# Patient Record
Sex: FEMALE | Race: WHITE | Employment: PART TIME | URBAN - METROPOLITAN AREA
[De-identification: names, ages, dates, MRNs, and addresses within clinical notes are randomized per-mention and may not be internally consistent; named-entity substitution may affect disease eponyms.]

---

## 2019-03-23 ENCOUNTER — OFFICE VISIT (OUTPATIENT)
Dept: URGENT CARE | Facility: URGENT CARE | Age: 39
End: 2019-03-23
Payer: OTHER MISCELLANEOUS

## 2019-03-23 VITALS
OXYGEN SATURATION: 100 % | TEMPERATURE: 98.2 F | HEART RATE: 67 BPM | DIASTOLIC BLOOD PRESSURE: 67 MMHG | SYSTOLIC BLOOD PRESSURE: 103 MMHG

## 2019-03-23 DIAGNOSIS — M25.531 RIGHT WRIST PAIN: Primary | ICD-10-CM

## 2019-03-23 NOTE — NURSING NOTE
"Chief Complaint   Patient presents with     Forms     needs forms filled out to return to work missed from 3/15-3/22 due to wrist pain       Initial /67   Pulse 67   Temp 98.2  F (36.8  C) (Oral)   SpO2 100%   Breastfeeding? No  Estimated body mass index is 29.33 kg/m  as calculated from the following:    Height as of 6/25/14: 1.664 m (5' 5.5\").    Weight as of 6/25/14: 81.2 kg (179 lb).  Medication Reconciliation: complete    Anastasiya Hadley CMA      "

## 2019-03-25 ENCOUNTER — OFFICE VISIT (OUTPATIENT)
Dept: FAMILY MEDICINE | Facility: CLINIC | Age: 39
End: 2019-03-25

## 2019-03-25 VITALS
SYSTOLIC BLOOD PRESSURE: 128 MMHG | TEMPERATURE: 98.2 F | DIASTOLIC BLOOD PRESSURE: 79 MMHG | HEIGHT: 67 IN | HEART RATE: 68 BPM | BODY MASS INDEX: 28.46 KG/M2 | OXYGEN SATURATION: 100 %

## 2019-03-25 DIAGNOSIS — M77.8 WRIST TENDONITIS: Primary | ICD-10-CM

## 2019-03-25 PROCEDURE — 99203 OFFICE O/P NEW LOW 30 MIN: CPT | Performed by: FAMILY MEDICINE

## 2019-03-25 NOTE — PROGRESS NOTES
"  SUBJECTIVE:   Leesa Shah is a 38 year old female who presents to clinic today for the following health issues:        Needs letter to return to work, was out due to wrist pain      Pt seen outside our system for wrist pain  She was told to wear wrist braces  She had tendonitis due to overuse.  Her wrist pain in now nearly gone  She would like to return to work without restrictions    Problem list and histories reviewed & adjusted, as indicated.  Additional history: as documented    Labs reviewed in EPIC    Reviewed and updated as needed this visit by clinical staff  Tobacco  Allergies  Meds  Med Hx  Surg Hx  Fam Hx  Soc Hx      Reviewed and updated as needed this visit by Provider         ROS:  Constitutional, HEENT, cardiovascular, pulmonary, gi and gu systems are negative, except as otherwise noted.    OBJECTIVE:     /79   Pulse 68   Temp 98.2  F (36.8  C) (Oral)   Ht 1.689 m (5' 6.5\")   SpO2 100%   BMI 28.46 kg/m    Body mass index is 28.46 kg/m .  GENERAL: healthy, alert and no distress  MS: no gross musculoskeletal defects noted, no edema    Diagnostic Test Results:  none     ASSESSMENT/PLAN:     (M77.8) Wrist tendonitis  (primary encounter diagnosis)  Comment: improved with rest,   Plan: okay to RTW          Trini Purcell MD  [facility]    "

## 2022-11-30 ENCOUNTER — OFFICE VISIT (OUTPATIENT)
Dept: ORTHOPEDICS | Facility: CLINIC | Age: 42
End: 2022-11-30
Payer: COMMERCIAL

## 2022-11-30 ENCOUNTER — ANCILLARY PROCEDURE (OUTPATIENT)
Dept: GENERAL RADIOLOGY | Facility: CLINIC | Age: 42
End: 2022-11-30
Attending: PEDIATRICS
Payer: COMMERCIAL

## 2022-11-30 DIAGNOSIS — M25.531 RIGHT WRIST PAIN: Primary | ICD-10-CM

## 2022-11-30 DIAGNOSIS — S69.91XA WRIST INJURY, RIGHT, INITIAL ENCOUNTER: ICD-10-CM

## 2022-11-30 DIAGNOSIS — M25.531 RIGHT WRIST PAIN: ICD-10-CM

## 2022-11-30 PROCEDURE — 99203 OFFICE O/P NEW LOW 30 MIN: CPT | Performed by: PEDIATRICS

## 2022-11-30 PROCEDURE — 73110 X-RAY EXAM OF WRIST: CPT | Mod: TC | Performed by: RADIOLOGY

## 2022-11-30 NOTE — PROGRESS NOTES
ASSESSMENT & PLAN    Leesa was seen today for pain.    Diagnoses and all orders for this visit:    Right wrist pain  -     XR Wrist Right G/E 3 Views; Future  -     Occupational Therapy Referral; Future    Wrist injury, right, initial encounter  -     Wrist/Arm/Hand Supplies Order for DME - ONLY FOR DME      Ddx: dorsal impingement, some component deQuervain, occult ganglion, ligamentous injury.  Hand therapy next.  Potential future MRI with contrast for wrist if not improving.  Questions answered. Discussed signs and symptoms that may indicate more serious issues; the patient was instructed to seek appropriate care if noted. eLesa indicates understanding of these issues and agrees with the plan.        See Patient Instructions  Patient Instructions   Right wrist findings most consistent with some dorsal impingement.  I think there is also some component de Quervain tendinitis, given exam findings and some locations of pain.  X-rays are without significant bony abnormality today.  We discussed symptom management, activity modification, potential for imaging, rehab, I also use of support for the hand and wrist.  Following discussion, plan hand therapy next, referral placed.  Brace/support options reviewed, including with wrist braces (with or without thumb spica).  Plan to monitor course with hand therapy approximately 4 weeks to start.  If improving well during that time, follow-up is open-ended.  If not improving well during that time, or if any other questions or concerns in the meantime, contact clinic.  We can certainly reconsider obtaining additional imaging if needed.      If you have any further questions for your physician or physician s care team you can contact them thru MyChart or by calling  [phone] and use option 3 to leave a voice message.   Messages received during business hours will be returned same day.          Vasile Watson DO  [facility]  [place]    -----  Chief Complaint   Patient presents with     Right Wrist - Pain       SUBJECTIVE  Leesa Shah is a/an 42 year old female who is seen as a self referral for evaluation of right wrist pain.  Pain has been present for about 3 weeks.  No specific injury, but she does have a 5 yo autistic child and she does need to lift them at times.       The patient is seen by themselves.  The patient is Right handed    Onset: 3 week(s) ago. Reports insidious onset without acute precipitating event.  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  Also lifts weights.  + pain at rest, dorsal wrist, notes more as a dullness at rest, but pain more with activities.  Has noted some tenderness.  No noted swelling or bruising. No noted joint noise.        REVIEW OF SYSTEMS:  Review of Systems      OBJECTIVE:  There were no vitals taken for this visit.       Hand/wrist (right):    Inspection:  No deformity noted.  No swelling. No ecchymosis.    Motion:  Forearm pronation , forearm supination full.  Wrist flexion grossly intact, some dorsal and radial pain  Wrist extension lacking 5-10 deg actively, with pain present; passive motion grossly intact, but also with pain  Wrist radial deviation intact, mild radial pain  Wrist ulnar deviation grossly intact, mild radial pain  Digit motion full    Strength:  Some discomfort with resisted thumb extension and abduction, but no pain with resisted wrist motion    Sensation:  Grossly intact light touch.    Radial pulses normal, +2/4, capillary refill brisk.    Palpation:  Tender radial styloid, first dorsal compartment, radiocarpal joint, mild intersection first-second compartments  Nontender remainder    Finkelstein positive.    Scaphoid shift  neg.      RADIOLOGY:  I independently ordered, visualized and reviewed these images with the patient  No acute bony abnormality noted.      Recent Results (from the past 24 hour(s))   XR Wrist Right G/E 3 Views    Narrative    XR WRIST RIGHT G/E 3 VIEWS 11/30/2022 9:48 AM     HISTORY: Right wrist pain    COMPARISON: None.       Impression    IMPRESSION: Normal joint spaces and alignment. No acute fracture.    CRISTY ZHAO MD         SYSTEM ID:  [id number]

## 2022-11-30 NOTE — LETTER
11/30/2022         RE: Leesa Shah  [address] [address]  [place] [address]        Dear Colleague,    Thank you for referring your patient, Leesa Shah, to the [facility] [place]. Please see a copy of my visit note below.    ASSESSMENT & PLAN    Leesa was seen today for pain.    Diagnoses and all orders for this visit:    Right wrist pain  -     XR Wrist Right G/E 3 Views; Future  -     Occupational Therapy Referral; Future    Wrist injury, right, initial encounter  -     Wrist/Arm/Hand Supplies Order for DME - ONLY FOR DME      Ddx: dorsal impingement, some component deQuervain, occult ganglion, ligamentous injury.  Hand therapy next.  Potential future MRI with contrast for wrist if not improving.  Questions answered. Discussed signs and symptoms that may indicate more serious issues; the patient was instructed to seek appropriate care if noted. Leesa indicates understanding of these issues and agrees with the plan.        See Patient Instructions  Patient Instructions   Right wrist findings most consistent with some dorsal impingement.  I think there is also some component de Quervain tendinitis, given exam findings and some locations of pain.  X-rays are without significant bony abnormality today.  We discussed symptom management, activity modification, potential for imaging, rehab, I also use of support for the hand and wrist.  Following discussion, plan hand therapy next, referral placed.  Brace/support options reviewed, including with wrist braces (with or without thumb spica).  Plan to monitor course with hand therapy approximately 4 weeks to start.  If improving well during that time, follow-up is open-ended.  If not improving well during that time, or if any other questions or concerns in the meantime, contact clinic.  We can certainly reconsider obtaining additional imaging if needed.      If you have any further questions for your physician or  physician s care team you can contact them thru MyChart or by calling  [phone] and use option 3 to leave a voice message.   Messages received during business hours will be returned same day.          DO [facility] [place]    -----  Chief Complaint   Patient presents with     Right Wrist - Pain       SUBJECTIVE  Leesa Shah is a/an 42 year old female who is seen as a self referral for evaluation of right wrist pain.  Pain has been present for about 3 weeks.  No specific injury, but she does have a 5 yo autistic child and she does need to lift them at times.       The patient is seen by themselves.  The patient is Right handed    Onset: 3 week(s) ago. Reports insidious onset without acute precipitating event.  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  Also lifts weights.  + pain at rest, dorsal wrist, notes more as a dullness at rest, but pain more with activities.  Has noted some tenderness.  No noted swelling or bruising. No noted joint noise.        REVIEW OF SYSTEMS:  Review of Systems      OBJECTIVE:  There were no vitals taken for this visit.       Hand/wrist (right):    Inspection:  No deformity noted.  No swelling. No ecchymosis.    Motion:  Forearm pronation , forearm supination full.  Wrist flexion grossly intact, some dorsal and radial pain  Wrist extension lacking 5-10 deg actively, with pain present; passive motion grossly intact, but also with pain  Wrist radial deviation intact, mild radial pain  Wrist ulnar deviation grossly intact, mild radial pain  Digit motion full    Strength:  Some discomfort with resisted thumb extension and abduction, but no pain with resisted wrist  motion    Sensation:  Grossly intact light touch.    Radial pulses normal, +2/4, capillary refill brisk.    Palpation:  Tender radial styloid, first dorsal compartment, radiocarpal joint, mild intersection first-second compartments  Nontender remainder    Finkelstein positive.    Scaphoid shift neg.      RADIOLOGY:  I independently ordered, visualized and reviewed these images with the patient  No acute bony abnormality noted.      Recent Results (from the past 24 hour(s))   XR Wrist Right G/E 3 Views    Narrative    XR WRIST RIGHT G/E 3 VIEWS 11/30/2022 9:48 AM     HISTORY: Right wrist pain    COMPARISON: None.       Impression    IMPRESSION: Normal joint spaces and alignment. No acute fracture.    CRISTY ZHAO MD         SYSTEM ID:  [id number]               Again, thank you for allowing me to participate in the care of your patient.        Sincerely,        Vasile Watson DO

## 2022-11-30 NOTE — PATIENT INSTRUCTIONS
Right wrist findings most consistent with some dorsal impingement.  I think there is also some component de Quervain tendinitis, given exam findings and some locations of pain.  X-rays are without significant bony abnormality today.  We discussed symptom management, activity modification, potential for imaging, rehab, also use of support for the hand and wrist.  Following discussion, plan hand therapy next, referral placed.  Brace/support options reviewed, including with wrist braces (with or without thumb spica).  Plan to monitor course with hand therapy approximately 4 weeks to start.  If improving well during that time, follow-up is open-ended.  If not improving well during that time, or if any other questions or concerns in the meantime, contact clinic.  We can certainly reconsider obtaining additional imaging if needed.      If you have any further questions for your physician or physician s care team you can contact them thru [employer] or by calling  [phone] and use option 3 to leave a voice message.   Messages received during business hours will be returned same day.

## 2022-12-29 ENCOUNTER — THERAPY VISIT (OUTPATIENT)
Dept: OCCUPATIONAL THERAPY | Facility: CLINIC | Age: 42
End: 2022-12-29
Payer: COMMERCIAL

## 2022-12-29 DIAGNOSIS — M25.531 RIGHT WRIST PAIN: ICD-10-CM

## 2022-12-29 PROCEDURE — 97165 OT EVAL LOW COMPLEX 30 MIN: CPT | Mod: GO | Performed by: OCCUPATIONAL THERAPIST

## 2022-12-29 PROCEDURE — 97110 THERAPEUTIC EXERCISES: CPT | Mod: GO | Performed by: OCCUPATIONAL THERAPIST

## 2022-12-29 PROCEDURE — 97760 ORTHOTIC MGMT&TRAING 1ST ENC: CPT | Mod: GO | Performed by: OCCUPATIONAL THERAPIST

## 2022-12-29 NOTE — PROGRESS NOTES
[facility]    OUTPATIENT Occupational Therapy ORTHOPEDIC EVALUATION  PLAN OF TREATMENT FOR OUTPATIENT REHABILITATION  (COMPLETE FOR INITIAL CLAIMS ONLY)  Patient's Last Name, First Name, M.I.  YOB: 1980  Leesa Shah    Provider s Name:  [facility]   Medical Record No.  [id number]   Start of Care Date:  12/29/22   Onset Date:   11/30/22 (therapy referral)   Treatment Diagnosis:    Medical Diagnosis:  Right wrist pain       Goals:     12/29/22 0500   Goal #1   Goal #1 household chores   Previous Performance Level Independent   Current Functional Task Reach   Current Performance Level pain 8/10   STG Target Perfomance Shelves   STG Target Perform Level pain 5/10 or less   Due Date 01/27/23   LTG Target Task/Performance Other - on additional line   Other Household Chores pain 3/10 or less reaching shelves   Due Date 02/26/23         Therapy Frequency:  1 x per week  Predicted Duration of Therapy Intervention:  2 months    Michelle Zayas OT                 I CERTIFY THE NEED FOR THESE SERVICES FURNISHED UNDER        THIS PLAN OF TREATMENT AND WHILE UNDER MY CARE     (Physician attestation of this document indicates review and certification of the therapy plan).                     Certification Date From:  12/29/22   Certification Date To:  02/26/23    Referring Provider:  Vasile Watson    Initial Assessment        See Epic Evaluation SOC Date: 12/29/22

## 2022-12-29 NOTE — PROGRESS NOTES
Hand Therapy Initial Evaluation    Current Date:  12/29/2022    Subjective:  Leesa Shah is a 42 year old right hand dominant female.    Diagnosis:   Right wrist pain  DOI:  11/30/2022 (theray referral)    Patient reports symptoms of pain, stiffness/loss of motion and weakness/loss of strength of the right wrist which occurred over the past 1-2 months possibly due to lifting autistic son. Since onset symptoms are unchanged  Special tests:  x-ray.  Previous treatment: Rest, NSAID's, OTC MEGHA thumb spica splint wear sometimes.  General health as reported by patient is excellent.  Pertinent medical history includes:None  Medical allergies:none.  Surgical history: none.  Medication history: None.    Occupational Profile Information:  Current occupation is [employer]   Currently working in normal job without restrictions  Job Tasks: Driving  Prior functional level:  no limitations  Barriers include:none  Mobility: No difficulty  Transportation: drives  Other: has 6 year old autistic son    Functional Outcome Measure:  Upper Extremity Functional Index  SCORE:   Column Totals: 73/80  (A lower score indicates greater disability.)    Objective:  Pain Level (Scale 0-10):   12/29/2022   At Rest 0   With Use 3   Worst 8     Pain Description:  Date 12/29/2022   Location Dorsal wrist and thumb   Pain Quality Aching   Frequency intermittent     Pain is worst  daytime   Exacerbated by  forced hyperextension, lifting, carrying, using thumb on phone   Relieved by NSAIDs and rest   Progression Unchanged     Sensation   WNL throughout all nerve distributions; per patient report    Edema   - none  + mild    ++ moderate    +++ severe    12/29/2022   1st dc -   Radial Styloid +      ROM  AROM thumb near WNL's, pain with thumb extension and flexion    Wrist 12/29/2022 12/29/2022   AROM (PROM) Left Right   Extension 80 65-   Flexion 55 60-   RD 20 10+   UD 45 40-   UD with Th Flex 30 15++     Resisted Testing  Pain Report: -  none  + mild    ++ moderate    +++ severe    12/29/2022   APL -   EPB +   EPL +   FCR + slight     Strength   (Measured in pounds)  Pain Report: - none  + mild    ++ moderate    +++ severe    12/29/2022 12/29/2022   Trials Left Right   1  2  3 48  44  48 32-  37-  26-   Average 47 32     Lat Pinch 12/29/2022 12/29/2022   Trials Left Right   1  2  3 15  13  12 12-  14-  11+   Average 13 12     3 Pt Pinch 12/29/2022 12/29/2022   Trials Left Right   1  2  3 13  13  12 13-  11+  11+   Average 13 12     Special Tests   Pain Report:  - none    + mild    ++ moderate    +++ severe    12/14/2022   WHAT Test +++   Finkelsteins ++   Thumb CMC Adduction Stress Test -   Thumb CMC Extension Stress Test +     Palpation  Pain Report:  - none    + mild    ++ moderate    +++ severe    12/29/2022   Radial Styloid ++   1st DC +   FCR -   Thumb CMC +   PIN Site -   Extensor Wad -   DRUJ -     Assessment:  Patient presents with symptoms consistent with diagnosis of right radial wrist pain/1st dc, with conservative intervention.     Patient's limitations or Problem List includes:  Pain, Decreased ROM/motion, Increased edema, Weakness, Decreased , Decreased pinch and Tightness in musculature of the right wrist and thumb which interferes with the patient's ability to perform Recreational Activities and Household Chores as compared to previous level of function.    Rehab Potential:  Excellent - Return to full activity, no limitations    Patient will benefit from skilled Occupational Therapy to increase ROM, flexibility, overall strength,  strength and pinch strength and decrease pain and edema to return to previous activity level and resume normal daily tasks and to reach their rehab potential.    Barriers to Learning:  No barrier    Communication Issues:  Patient appears to be able to clearly communicate and understand verbal and written communication and follow directions correctly.    Chart Review: Chart Review and Simple  "history review with patient    Identified Performance Deficits: child rearing, home establishment and management and leisure activities    Assessment of Occupational Performance:  3-5 Performance Deficits    Clinical Decision Making (Complexity): Low complexity    Treatment Explanation:  The following has been discussed with the patient:  RX ordered/plan of care  Anticipated outcomes  Possible risks and side effects    Plan:  Frequency:  1 X week, once daily  Duration:  for 2 months    Treatment Plan:    Modalities:    US   Therapeutic Exercise:    AROM, PROM, Tendon Gliding and Isotonics  Neuromuscular re-ed:   Kinesiotaping  Manual Techniques:   Friction massage and Myofascial release  Orthotic Fabrication:    Static Forearm based  Self Care:    Self Care Tasks and Ergonomic Considerations    Discharge Plan:  Achieve all LTG.  Independent in home treatment program.  Reach maximal therapeutic benefit.    Discharge Plan:    Achieve all LTG.  Independent in home treatment program.  Reach maximal therapeutic benefit.    Home Program:   Warmth for stiffness  Ice after activity for pain  Self TFM to 1st dorsal compartment  Self MFR to EPB/ABL  AROM of wrist and thumb  Ot[place]ock thumb spica orthosis sleeping and per symptoms day  Avoid activities that exacerbate pain in the wrist or thumb, \"park\" thumb and keep wrist nuetral    Next Visit:  See in 1 week  Assess response to HEP and otobock orthosis  Progress to PROM with stretch into simultaneous thumb flexion and ulnar deviation  Consider US to radial wrist if continued tenderness  "

## 2023-01-11 ENCOUNTER — THERAPY VISIT (OUTPATIENT)
Dept: OCCUPATIONAL THERAPY | Facility: CLINIC | Age: 43
End: 2023-01-11
Payer: COMMERCIAL

## 2023-01-11 DIAGNOSIS — M25.531 RIGHT WRIST PAIN: Primary | ICD-10-CM

## 2023-01-11 PROCEDURE — 97140 MANUAL THERAPY 1/> REGIONS: CPT | Mod: GO | Performed by: OCCUPATIONAL THERAPIST

## 2023-01-11 PROCEDURE — 97110 THERAPEUTIC EXERCISES: CPT | Mod: GO | Performed by: OCCUPATIONAL THERAPIST

## 2023-01-11 NOTE — PROGRESS NOTES
"SOAP note objective information for 1/11/2023:    Diagnosis:   Right wrist pain  DOI:  11/30/2022 (theray referral)    Pain Level (Scale 0-10):   12/29/2022 1/11/2023   At Rest 0    With Use 3    Worst 8 5     ROM  Wrist 12/29/2022 12/29/2022 1/11/2023   AROM (PROM) Left Right Right   Extension 80 65- 70   Flexion 55 60- 60   RD 20 10+ 15   UD 45 40- 40   UD with Th Flex 30 15++ 15++     Palpation  Pain Report:  - none    + mild    ++ moderate    +++ severe    12/29/2022 1/11/2023   Radial Styloid ++ ++   1st DC + -   FCR - -   Thumb CMC + -   PIN Site - -   Extensor Wad - -   DRUJ - -     Home Program:   Warmth for stiffness  Ice after activity for pain  Self TFM to 1st dorsal compartment  Self MFR to EPB/ABL  AROM of wrist and thumb  Gentle deQ stretch  Ot[place]ock thumb spica orthosis sleeping and per symptoms day  Avoid activities that exacerbate pain in the wrist or thumb, \"park\" thumb and keep wrist nuetral    Next Visit:  See in 1 week  Assess response to gentle deQ stretch  Continue US to radial wrist   Consider k-tape    Please refer to the daily flowsheet for treatment today, total treatment time and time spent performing 1:1 timed codes.   "

## 2023-03-07 ENCOUNTER — OFFICE VISIT (OUTPATIENT)
Dept: FAMILY MEDICINE | Facility: CLINIC | Age: 43
End: 2023-03-07
Payer: COMMERCIAL

## 2023-03-07 VITALS
HEART RATE: 71 BPM | TEMPERATURE: 98.3 F | DIASTOLIC BLOOD PRESSURE: 73 MMHG | SYSTOLIC BLOOD PRESSURE: 114 MMHG | OXYGEN SATURATION: 100 % | RESPIRATION RATE: 16 BRPM

## 2023-03-07 DIAGNOSIS — F41.9 ANXIETY: Primary | ICD-10-CM

## 2023-03-07 PROCEDURE — 99203 OFFICE O/P NEW LOW 30 MIN: CPT | Performed by: FAMILY MEDICINE

## 2023-03-07 RX ORDER — HYDROXYZINE HYDROCHLORIDE 25 MG/1
25 TABLET, FILM COATED ORAL EVERY 8 HOURS PRN
Qty: 90 TABLET | Refills: 1 | Status: SHIPPED | OUTPATIENT
Start: 2023-03-07

## 2023-03-07 RX ORDER — SERTRALINE HYDROCHLORIDE 25 MG/1
25 TABLET, FILM COATED ORAL DAILY
Qty: 90 TABLET | Refills: 1 | Status: SHIPPED | OUTPATIENT
Start: 2023-03-07 | End: 2023-06-27

## 2023-03-07 ASSESSMENT — ANXIETY QUESTIONNAIRES
2. NOT BEING ABLE TO STOP OR CONTROL WORRYING: NOT AT ALL
3. WORRYING TOO MUCH ABOUT DIFFERENT THINGS: NOT AT ALL
IF YOU CHECKED OFF ANY PROBLEMS ON THIS QUESTIONNAIRE, HOW DIFFICULT HAVE THESE PROBLEMS MADE IT FOR YOU TO DO YOUR WORK, TAKE CARE OF THINGS AT HOME, OR GET ALONG WITH OTHER PEOPLE: NOT DIFFICULT AT ALL
1. FEELING NERVOUS, ANXIOUS, OR ON EDGE: SEVERAL DAYS
7. FEELING AFRAID AS IF SOMETHING AWFUL MIGHT HAPPEN: NOT AT ALL
6. BECOMING EASILY ANNOYED OR IRRITABLE: MORE THAN HALF THE DAYS

## 2023-03-07 ASSESSMENT — PATIENT HEALTH QUESTIONNAIRE - PHQ9
5. POOR APPETITE OR OVEREATING: MORE THAN HALF THE DAYS
SUM OF ALL RESPONSES TO PHQ QUESTIONS 1-9: 9

## 2023-03-07 ASSESSMENT — PAIN SCALES - GENERAL: PAINLEVEL: NO PAIN (0)

## 2023-03-07 NOTE — PROGRESS NOTES
Assessment & Plan     Anxiety  Leesa Shah is a 42 year old female who presents today for concern of anxiety   She requested medication to help her stay calm while taking care of her autistic son    I had a discussion with the patient about her condition , diagnosis  And treatment options.   I recommend the following :    - hydrOXYzine (ATARAX) 25 MG tablet; Take 1 tablet (25 mg) by mouth every 8 hours as needed for itching  - sertraline (ZOLOFT) 25 MG tablet; Take 1 tablet (25 mg) by mouth daily    Follow up in 3 months   Side effects reviewed                Return in about 3 months (around 6/7/2023) for in person.    Homar Hubbard MD  [facility]   Leesa is a 42 year old, presenting for the following health issues:  Anxiety      History of Present Illness       Reason for visit:  Anxiety    She eats 2-3 servings of fruits and vegetables daily.She consumes 1 sweetened beverage(s) daily.She exercises with enough effort to increase her heart rate 30 to 60 minutes per day.  She exercises with enough effort to increase her heart rate 5 days per week.   She is taking medications regularly.    She is concerned about anxiety   Past Medical History:   Diagnosis Date     Bulimia nervosa 15 y.o.    Bulimia & anorexia in the past     Infectious mononucleosis 5/99     Myopia      Obesity, unspecified      Shoulder (girdle) dystocia during labor and deliver, delivered 10/22/05    Hospitalized     Sprain of lumbar region 4/17/00    MId back strain - [facility]     Past Surgical History:   Procedure Laterality Date     HC EPISIOTOMY/VAGINAL REPR BY DANG SORIA  12/18/01    DR HUFFMAN      REPAIR SUPERFICIAL, WOUND FACE/EAR <2.5 CM  10/29/98    Laceration (L) eyebrow     NO HISTORY OF SURGERY  11-26-07    EYES     [facility] FULL ROUT OBSTE CARE,VAGINAL DELIV  12/18/01    BABY GIRL     [facility] FULL ROUT OBSTE CARE,VAGINAL DELIV  10/20/05    baby girl         Family History    Problem Relation Age of Onset     Depression Mother         on prozac     Alcohol/Drug Father         etoh and drug abuse     Allergies Maternal Grandmother         seasonal     Hypertension Maternal Grandmother         on meds     Lipids Maternal Grandmother      Respiratory Maternal Grandmother         asthma     Genetic Disorder Maternal Grandmother         history of meningocele; spina bifida     Asthma Maternal Grandmother      C.A.D. Paternal Grandmother      Alcohol/Drug Paternal Grandfather         etoh     Alzheimer Disease Other         paternal great grandmother     Diabetes Other         Great aunts & great uncles     Asthma Daughter      Cerebrovascular Disease No family hx of      Breast Cancer No family hx of      Cancer - colorectal No family hx of      Prostate Cancer No family hx of      Cancer No family hx of      Eye Disorder No family hx of      Heart Disease No family hx of      Thyroid Disease No family hx of      SH:    Marital status: single -    Kids: 4  Employment: on call   Exercise: 5 days per week   Tobacco: no   Etoh: no   Recreational drugs: no   Caffeine: coffee     Review of Systems   Constitutional, HEENT, cardiovascular, pulmonary, gi and gu systems are negative, except as otherwise noted.      Objective    /73   Pulse 71   Temp 98.3  F (36.8  C) (Tympanic)   Resp 16   LMP  (LMP Unknown)   SpO2 100%   There is no height or weight on file to calculate BMI.  Physical Exam  Vitals and nursing note reviewed.   Constitutional:       General: She is not in acute distress.     Appearance: Normal appearance. She is not ill-appearing, toxic-appearing or diaphoretic.   Neurological:      Mental Status: She is alert.

## 2023-03-27 PROBLEM — M25.531 RIGHT WRIST PAIN: Status: RESOLVED | Noted: 2022-12-29 | Resolved: 2023-03-27

## 2023-03-27 NOTE — PROGRESS NOTES
Discharge Summary - Hand Therapy    Patient no showed for 1/25/2023 appointment and did not return to therapy.  Assume all goals were met to patient's satisfaction. D/C from hand therapy.

## 2023-06-07 ENCOUNTER — TRANSCRIBE ORDERS (OUTPATIENT)
Dept: OTHER | Age: 43
End: 2023-06-07

## 2023-06-07 DIAGNOSIS — M54.16 RADICULOPATHY, LUMBAR REGION: Primary | ICD-10-CM

## 2023-06-26 ENCOUNTER — TRANSFERRED RECORDS (OUTPATIENT)
Dept: HEALTH INFORMATION MANAGEMENT | Facility: CLINIC | Age: 43
End: 2023-06-26

## 2023-06-26 ENCOUNTER — THERAPY VISIT (OUTPATIENT)
Dept: PHYSICAL THERAPY | Facility: CLINIC | Age: 43
End: 2023-06-26
Payer: COMMERCIAL

## 2023-06-26 DIAGNOSIS — M54.16 RADICULOPATHY, LUMBAR REGION: ICD-10-CM

## 2023-06-26 PROCEDURE — 97530 THERAPEUTIC ACTIVITIES: CPT | Mod: GP

## 2023-06-26 PROCEDURE — 97110 THERAPEUTIC EXERCISES: CPT | Mod: GP

## 2023-06-26 PROCEDURE — 97161 PT EVAL LOW COMPLEX 20 MIN: CPT | Mod: GP

## 2023-06-26 NOTE — PROGRESS NOTES
PHYSICAL THERAPY EVALUATION  Type of Visit: Evaluation    See electronic medical record for Abuse and Falls Screening details.    Subjective      Presenting condition or subjective complaint: back painLow back pain comes and goes. Radicular symptoms down R side more than L. Her pain is worst with lifting, transitional movements, unsupported sitting (<20 min), prolonged positioning. She has had to adjust her exercise and preferred activities due to her pain.   Date of onset: 06/26/23 (couple year history.)    Relevant medical history:     Dates & types of surgery:      Prior diagnostic imaging/testing results:       Prior therapy history for the same diagnosis, illness or injury:        Prior Level of Function   Transfers: Independent  Ambulation: Independent  ADL: Independent    Living Environment  Social support: Alone   Type of home: House   Stairs to enter the home: Yes       Ramp: No   Stairs inside the home: No       Help at home:    Equipment owned:       Employment: Not Applicable    Hobbies/Interests:      Patient goals for therapy:      Pain assessment: See objective evaluation for additional pain details     Objective   LUMBAR SPINE EVALUATION  PAIN: Pain Level at Rest: 0/10  Pain Level with Use: 7/10  Pain Quality: Dull and Stabbing  Pain is Exacerbated By: lifting, transitinal movements, sitting upsupported.   Pain is Relieved By: cold and previously tried neproxin but stopped at Pain clinic and used voltairin cream (1-2/day for couple weeks)    POSTURE: WFL    GAIT:   Weightbearing Status: WBAT  Assistive Device(s): None  Gait Deviations: WFL    BALANCE/PROPRIOCEPTION: WFL    WEIGHTBEARING ALIGNMENT: Pt demonstrates a standing and sitting postural deviation of R trunk lean, R shoulder elevated,, and slight rotation to the R.       ROM/STRENGTH:   (Degrees) Left AROM Left MMT Right AROM Right MMT   Hip Flexion 5  4    Hip Extension       Hip Abduction 5  3    Hip Adduction 5  4    Hip Internal Rotation 5   5    Hip External Rotation 5  5    Knee Flexion 5  5    Knee Extension 5  5    Lumbar Side glide 75% - tight feeling 75% pain on left pinch   Lumbar Flexion 75%   Lumbar Extension 50%   Pain: pain noted in central and left lumbar region with active motion  End feel:     PELVIC/SI SCREEN: Standing Forward Bend: WNL, Wanda (March): L SIJ reduced mobility, Sacroiliac Provocation Test: WNL    LUMBAR/HIP Special Tests: Positive slump for R scaitic nerve impingement.        PALPATION: Tenderness to B lumbar paraspinsals, QL, piriformis, SIJ, and transverse process L3-L5.    SPINAL SEGMENTAL CONCLUSIONS: FRSR L3, L4, L5      Assessment & Plan   CLINICAL IMPRESSIONS   Medical Diagnosis: Chronic Low Back Pain    Treatment Diagnosis: Low Back Pain, segmental hypomobility. weakness   Impression/Assessment: Patient is a 43 year old female with Low Back Pain complaints.  The following significant findings have been identified: Pain, Decreased ROM/flexibility, Decreased joint mobility, Decreased strength, Impaired muscle performance and Decreased activity tolerance. These impairments interfere with their ability to perform self care tasks, recreational activities, household chores, household mobility and community mobility as compared to previous level of function.     Clinical Decision Making (Complexity):   Clinical Presentation: Stable/Uncomplicated  Clinical Presentation Rationale: based on medical and personal factors listed in PT evaluation  Clinical Decision Making (Complexity): Low complexity    PLAN OF CARE  Treatment Interventions:  Modalities: Cryotherapy, E-stim, Hot Pack  Interventions: Gait Training, Manual Therapy, Neuromuscular Re-education, Therapeutic Activity, Therapeutic Exercise, Self-Care/Home Management    Long Term Goals     PT Goal 1  Goal Identifier: LTG 1  Goal Description: Pt will be able to lift 25# floor to waist as needed for daily ADL's and care of her son with no more than 2/10 pain.  Target  Date: 09/18/23  PT Goal 2  Goal Identifier: LTG 2  Goal Description: Pt will be able to sit for at least 1 hour at a time for increased tolerance to leisure and social activities  Target Date: 09/18/23      Frequency of Treatment: 1x/week  Duration of Treatment: 12 weeks    Education Assessment:   Learner/Method: Patient;No Barriers to Learning    Risks and benefits of evaluation/treatment have been explained.   Patient/Family/caregiver agrees with Plan of Care.     Evaluation Time:       Signing Clinician: BOB [facility]                                                                                   OUTPATIENT PHYSICAL THERAPY      PLAN OF TREATMENT FOR OUTPATIENT REHABILITATION   Patient's Last Name, First Name, KOURTNEYAbbieALEXANDRIAAbbie  PabloLeesa OCONNELL YOB: 1980   Provider's Name   [facility]   Medical Record No.  [id number]     Onset Date: 06/26/23 (couple year history.)  Start of Care Date: 06/26/23     Medical Diagnosis:  Chronic Low Back Pain      PT Treatment Diagnosis:  Low Back Pain, segmental hypomobility. weakness Plan of Treatment  Frequency/Duration: 1x/week/ 12 weeks    Certification date from 06/26/23 to 09/18/23         See note for plan of treatment details and functional goals     Denisse Villalobos, PT                         I CERTIFY THE NEED FOR THESE SERVICES FURNISHED UNDER        THIS PLAN OF TREATMENT AND WHILE UNDER MY CARE .             Physician Signature               Date    X_____________________________________________________                        Referring Provider:  Amanda An      Initial Assessment  See Epic Evaluation- Start of Care Date: 06/26/23

## 2023-06-27 ENCOUNTER — OFFICE VISIT (OUTPATIENT)
Dept: FAMILY MEDICINE | Facility: CLINIC | Age: 43
End: 2023-06-27
Payer: COMMERCIAL

## 2023-06-27 VITALS
DIASTOLIC BLOOD PRESSURE: 76 MMHG | SYSTOLIC BLOOD PRESSURE: 113 MMHG | TEMPERATURE: 97.6 F | OXYGEN SATURATION: 100 % | HEART RATE: 62 BPM | RESPIRATION RATE: 16 BRPM

## 2023-06-27 DIAGNOSIS — F41.9 ANXIETY: Primary | ICD-10-CM

## 2023-06-27 DIAGNOSIS — M65.4 DE QUERVAIN'S SYNDROME (TENOSYNOVITIS): ICD-10-CM

## 2023-06-27 PROCEDURE — 99214 OFFICE O/P EST MOD 30 MIN: CPT | Performed by: FAMILY MEDICINE

## 2023-06-27 RX ORDER — INDOMETHACIN 25 MG/1
25 CAPSULE ORAL 2 TIMES DAILY PRN
Qty: 60 CAPSULE | Refills: 0 | Status: SHIPPED | OUTPATIENT
Start: 2023-06-27 | End: 2023-08-29

## 2023-06-27 RX ORDER — METHYLPREDNISOLONE 4 MG
TABLET, DOSE PACK ORAL
Qty: 21 TABLET | Refills: 0 | Status: SHIPPED | OUTPATIENT
Start: 2023-06-27 | End: 2023-12-04

## 2023-06-27 ASSESSMENT — ANXIETY QUESTIONNAIRES
8. IF YOU CHECKED OFF ANY PROBLEMS, HOW DIFFICULT HAVE THESE MADE IT FOR YOU TO DO YOUR WORK, TAKE CARE OF THINGS AT HOME, OR GET ALONG WITH OTHER PEOPLE?: SOMEWHAT DIFFICULT
6. BECOMING EASILY ANNOYED OR IRRITABLE: NOT AT ALL
4. TROUBLE RELAXING: SEVERAL DAYS
GAD7 TOTAL SCORE: 4
7. FEELING AFRAID AS IF SOMETHING AWFUL MIGHT HAPPEN: NOT AT ALL
1. FEELING NERVOUS, ANXIOUS, OR ON EDGE: SEVERAL DAYS
2. NOT BEING ABLE TO STOP OR CONTROL WORRYING: SEVERAL DAYS
GAD7 TOTAL SCORE: 4
5. BEING SO RESTLESS THAT IT IS HARD TO SIT STILL: NOT AT ALL
7. FEELING AFRAID AS IF SOMETHING AWFUL MIGHT HAPPEN: NOT AT ALL
3. WORRYING TOO MUCH ABOUT DIFFERENT THINGS: SEVERAL DAYS
IF YOU CHECKED OFF ANY PROBLEMS ON THIS QUESTIONNAIRE, HOW DIFFICULT HAVE THESE PROBLEMS MADE IT FOR YOU TO DO YOUR WORK, TAKE CARE OF THINGS AT HOME, OR GET ALONG WITH OTHER PEOPLE: SOMEWHAT DIFFICULT

## 2023-06-27 ASSESSMENT — PAIN SCALES - GENERAL: PAINLEVEL: MILD PAIN (2)

## 2023-06-27 NOTE — PROGRESS NOTES
Assessment & Plan     Anxiety  Leesa Shah is a 43 year old female who presents today for follow-up on anxiety.  She was started on Zoloft 25 mg 3 months ago to help with anxiety.  She feels medication is working but she would like to increase the dose to 50 mg.  No side effects reported  Discussed with patient her concern.  Will increase Zoloft to 50 mg daily.  Follow-up in 3 months  - sertraline (ZOLOFT) 50 MG tablet; Take 1 tablet (50 mg) by mouth daily    De Quervain's syndrome (tenosynovitis)  Patient reports right hand pain/wrist pain.  Pain started several months.  She was seen by sports medicine in November 2023.  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  She was diagnosed with dorsal impingement and possible de Quervain's tendinitis.  X-ray done previously did not show any fracture or dislocation.  She feels hand therapy helped with her symptoms and requested referral.  Discussed with patient concern  I recommend short course of prednisone, Medrol Dosepak prescribed.  Use indomethacin 25 mg twice daily as needed for a short period of time.  Discussed side effects.  Continue use hand brace particularly with activity  Referral placed to hand therapy  Follow-up in 3 months, if no improvement will obtain MRI  - Occupational Therapy Referral; Future  - methylPREDNISolone (MEDROL DOSEPAK) 4 MG tablet therapy pack; Follow Package Directions  - indomethacin (INDOCIN) 25 MG capsule; Take 1 capsule (25 mg) by mouth 2 times daily as needed for moderate pain  Patient verbalized understanding and agreed on the plan of care.  All questions answered.               Homar Hubbard MD  [facility]    Alicia   Leesa is a 43 year old, presenting for the following health issues:   Follow Up and Wrist Pain (Right )        6/27/2023     7:47 AM   Additional Questions   Roomed by Makenize   Accompanied by Self     History of Present Illness       Mental Health  Follow-up:  Patient presents to follow-up on Depression & Anxiety.Patient's depression since last visit has been:  Medium  The patient is having other symptoms associated with depression.  Patient's anxiety since last visit has been:  Medium  The patient is having other symptoms associated with anxiety.  Any significant life events: No  Patient is not feeling anxious or having panic attacks.  Patient has no concerns about alcohol or drug use.    Reason for visit:  Wrist pain &depression    She eats 4 or more servings of fruits and vegetables daily.She consumes 1 sweetened beverage(s) daily.She exercises with enough effort to increase her heart rate 30 to 60 minutes per day.  She exercises with enough effort to increase her heart rate 5 days per week.   She is taking medications regularly.  Today's ADDI-7 Score: 4               Review of Systems         Objective    /76   Pulse 62   Temp 97.6  F (36.4  C) (Tympanic)   Resp 16   SpO2 100%   There is no height or weight on file to calculate BMI.  Physical Exam  Vitals and nursing note reviewed.   Constitutional:       General: She is not in acute distress.     Appearance: Normal appearance. She is not ill-appearing, toxic-appearing or diaphoretic.   Musculoskeletal:        Hands:    Neurological:      Mental Status: She is alert.

## 2023-07-10 ENCOUNTER — THERAPY VISIT (OUTPATIENT)
Dept: PHYSICAL THERAPY | Facility: CLINIC | Age: 43
End: 2023-07-10
Payer: COMMERCIAL

## 2023-07-10 DIAGNOSIS — M54.16 RADICULOPATHY, LUMBAR REGION: Primary | ICD-10-CM

## 2023-07-10 PROCEDURE — 97112 NEUROMUSCULAR REEDUCATION: CPT | Mod: GP

## 2023-07-10 PROCEDURE — 97110 THERAPEUTIC EXERCISES: CPT | Mod: GP

## 2023-07-12 ENCOUNTER — THERAPY VISIT (OUTPATIENT)
Dept: OCCUPATIONAL THERAPY | Facility: CLINIC | Age: 43
End: 2023-07-12
Attending: FAMILY MEDICINE
Payer: COMMERCIAL

## 2023-07-12 DIAGNOSIS — M25.531 RIGHT WRIST PAIN: Primary | ICD-10-CM

## 2023-07-12 DIAGNOSIS — M65.4 DE QUERVAIN'S SYNDROME (TENOSYNOVITIS): ICD-10-CM

## 2023-07-12 PROCEDURE — 97110 THERAPEUTIC EXERCISES: CPT | Mod: GO | Performed by: OCCUPATIONAL THERAPIST

## 2023-07-12 PROCEDURE — 97535 SELF CARE MNGMENT TRAINING: CPT | Mod: GO | Performed by: OCCUPATIONAL THERAPIST

## 2023-07-12 PROCEDURE — 97165 OT EVAL LOW COMPLEX 30 MIN: CPT | Mod: GO | Performed by: OCCUPATIONAL THERAPIST

## 2023-07-12 NOTE — PROGRESS NOTES
OCCUPATIONAL THERAPY EVALUATION  Type of Visit: Evaluation    See electronic medical record for Abuse and Falls Screening details.    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     Patient goals for therapy: lift with less pain     Objective   ADDITIONAL HISTORY:  Right hand dominant  Patient reports symptoms of pain, stiffness/loss of motion, weakness/loss of strength and edema  Transportation: drives    Functional Outcome Measure:   Functional Outcome Measure:  Upper Extremity Functional Index  SCORE:   Column Totals: 64/80  (A lower score indicates greater disability.)    Pain Level (Scale 0-10):   7/12/2023   At Rest 0   With Use 5-6     Pain Description:  Date 7/12/2023   Location Radial wrist   Pain Quality Aching and Dull   Frequency intermittent     Pain is worst  daytime   Exacerbated by  lifting   Relieved by cold and rest   Progression Somewhat better     Sensation   WNL throughout all nerve distributions; per patient report    Edema   - none  + mild    ++ moderate    +++ severe    7/12/2023   1st DC -   Radial Styloid ++      ROM  Thumb AROM WNL's, pain with thumb extension   Wrist 7/12/2023 7/12/2023   AROM (PROM) Left Right   Extension 85 70-   Flexion 50 60-   RD 10 5+   UD 45 30-   UD with Th Flex 35 10+     Resisted Testing  Pain Report: - none  + mild    ++ moderate    +++ severe    7/12/2023   APL +   EPB +   EPL ++   FCR -      Strength   (Measured in pounds)  Pain Report: - none  + mild    ++ moderate    +++ severe    7/12/2023 7/12/2023   Trials Left Right   1  2  3 47  44  44 38-  44-  37-   Average 45 40     Lat Pinch 7/12/2023 7/12/2023   Trials Left Right   1  2  3 15  14  12 11+  12+  12+   Average 14 12     3 Pt Pinch 7/12/2023 7/12/2023   Trials Left Right   1  2  3 14  13  13 12  11+  10+   Average 13 11     Special Tests   Pain Report:  - none    + mild    ++ moderate    +++ severe    7/12/2023   WHAT Test +   Finkelsteins - tight no pain   Radial Nerve Tinel's (DRSN) -   Thumb CMC Adduction Stress Test -   Thumb CMC Extension Stress Test -     Palpation  Pain Report:  - none    + mild    ++ moderate    +++ severe    7/12/2023   Radial Styloid ++   1st DC +   FCR -   Thumb CMC -   PIN Site -   Extensor Wad -     Assessment & Plan   CLINICAL IMPRESSIONS   Medical Diagnosis: deQuervains    Treatment Diagnosis: R wrist pain    Impression/Assessment: Pt is a 43 year old female presenting to Occupational Therapy due to right wrist pain. Patient's limitations or Problem List includes: Pain, Decreased ROM/motion, Increased edema, Weakness, Decreased , Decreased pinch and Tightness in musculature of the right wrist which interferes with the patient's ability to perform Self Care Tasks (bathing), Work Tasks, Recreational Activities and Household Chores as compared to previous level of function.    Clinical Decision Making (Complexity):   Assessment of Occupational Performance: 5 or more Performance Deficits  Occupational Performance Limitations: bathing/showering, home establishment and management, work and leisure activities  Clinical Decision Making (Complexity): Low complexity    PLAN OF CARE  Treatment Interventions:   Modalities:  US  Therapeutic Exercise:  AROM, PROM, Tendon Gliding and Isotonics  Neuromuscular re-education:  Kinesiotaping  Manual Techniques:  Friction massage and Myofascial release  Orthotic  Fabrication:  Static Forearm based  Self Care:  Self Care Tasks and Ergonomic Considerations    Long Term Goals   OT Goal 1  Goal Identifier: lifting  Goal Description: Pt will decrease pain to be able to lift a grocery bag without difficulty  Rationale:  (In order to maximize safety and independence with performance of pain free ADL s)  Goal Progress: pain 6/10  Target Date: 10/09/23      Frequency of Treatment: 1 x per week  Duration of Treatment: 1 month, tapering to 2 x per month for 2 months     Recommended Referrals to Other Professionals: none  Education Assessment: Learner/Method: Patient;Demonstration;Pictures/Video  Education Comments: PTRx on phone     Risks and benefits of evaluation/treatment have been explained.   Patient/Family/caregiver agrees with Plan of Care.     Evaluation Time:    OT Eval, Low Complexity Minutes (33687): 20   Present: Not applicable     Signing Clinician: Michelle Zayas OT        [facility]                                                                                   OUTPATIENT OCCUPATIONAL THERAPY      PLAN OF TREATMENT FOR OUTPATIENT REHABILITATION   Patient's Last Name, First Name, KOURTNEY.Leesa Bravo YOB: 1980   Provider's Name   [facility]   Medical Record No.  [id number]     Onset Date: 06/27/23 (therapy referral) Start of Care Date: 07/12/23     Medical Diagnosis:  deQuervains      OT Treatment Diagnosis:  R wrist pain Plan of Treatment  Frequency/Duration:1 x per week/1 month, tapering to 2 x per month for 2 months    Certification date from 07/12/23   To 10/09/23        See note for plan of treatment details and functional goals     Michelle Zayas OT                         I CERTIFY THE NEED FOR THESE SERVICES FURNISHED UNDER        THIS PLAN OF TREATMENT AND WHILE UNDER MY CARE     (Physician attestation of this document indicates review and certification of the therapy  plan).                  Referring Provider:  Homar Hubbard      Initial Assessment  See Epic Evaluation- 07/12/23

## 2023-07-17 ENCOUNTER — THERAPY VISIT (OUTPATIENT)
Dept: OCCUPATIONAL THERAPY | Facility: CLINIC | Age: 43
End: 2023-07-17
Attending: FAMILY MEDICINE
Payer: COMMERCIAL

## 2023-07-17 DIAGNOSIS — M25.531 RIGHT WRIST PAIN: Primary | ICD-10-CM

## 2023-07-17 DIAGNOSIS — M65.4 DE QUERVAIN'S SYNDROME (TENOSYNOVITIS): ICD-10-CM

## 2023-07-17 PROCEDURE — 97140 MANUAL THERAPY 1/> REGIONS: CPT | Mod: GO | Performed by: OCCUPATIONAL THERAPIST

## 2023-07-17 PROCEDURE — 97110 THERAPEUTIC EXERCISES: CPT | Mod: GO | Performed by: OCCUPATIONAL THERAPIST

## 2023-07-17 NOTE — PROGRESS NOTES
Objective Information for 7/17/2023:    Pain Level (Scale 0-10):   7/12/2023 7/17/2023   At Rest 0    With Use 5-6      ROM  Thumb AROM WNL's, pain with thumb extension   Wrist 7/12/2023 7/12/2023 7/17/2023   AROM (PROM) Left Right Right   Extension 85 70-    Flexion 50 60-    RD 10 5+    UD 45 30-    UD with Th Flex 35 10+      Palpation  Pain Report:  - none    + mild    ++ moderate    +++ severe    7/12/2023 7/17/2023   Radial Styloid ++    1st DC +

## 2023-08-23 ENCOUNTER — THERAPY VISIT (OUTPATIENT)
Dept: PHYSICAL THERAPY | Facility: CLINIC | Age: 43
End: 2023-08-23
Payer: COMMERCIAL

## 2023-08-23 DIAGNOSIS — G89.29 CHRONIC RIGHT-SIDED LOW BACK PAIN WITH RIGHT-SIDED SCIATICA: Primary | ICD-10-CM

## 2023-08-23 DIAGNOSIS — M54.41 CHRONIC RIGHT-SIDED LOW BACK PAIN WITH RIGHT-SIDED SCIATICA: Primary | ICD-10-CM

## 2023-08-23 PROCEDURE — 97140 MANUAL THERAPY 1/> REGIONS: CPT | Mod: GP | Performed by: PHYSICAL THERAPIST

## 2023-08-23 PROCEDURE — 97110 THERAPEUTIC EXERCISES: CPT | Mod: GP | Performed by: PHYSICAL THERAPIST

## 2023-08-24 ENCOUNTER — VIRTUAL VISIT (OUTPATIENT)
Dept: FAMILY MEDICINE | Facility: CLINIC | Age: 43
End: 2023-08-24
Payer: COMMERCIAL

## 2023-08-24 DIAGNOSIS — F41.9 ANXIETY: Primary | ICD-10-CM

## 2023-08-24 PROCEDURE — 99213 OFFICE O/P EST LOW 20 MIN: CPT | Mod: VID | Performed by: FAMILY MEDICINE

## 2023-08-24 RX ORDER — BUPROPION HYDROCHLORIDE 150 MG/1
150 TABLET ORAL EVERY MORNING
Qty: 90 TABLET | Refills: 1 | Status: SHIPPED | OUTPATIENT
Start: 2023-08-24 | End: 2024-05-07

## 2023-08-24 NOTE — PROGRESS NOTES
Leesa is a 43 year old who is being evaluated via a billable video visit.      How would you like to obtain your AVS? MyChart  If the video visit is dropped, the invitation should be resent by: Text to cell phone: [phone]  Will anyone else be joining your video visit? No          Assessment & Plan     Anxiety  Leesa Shah is a 43 year old female who presents today for follow-up on anxiety.  She has been on Zoloft 50 mg, she reports her symptoms are worse.  She is wondering if she can switch to Wellbutrin because she was on it previously and had good response.  Her mom also had good response from Wellbutrin  Discussed with patient concern.  Advised to taper off Zoloft over the next 2 weeks, then restart Wellbutrin 150 mg XL.  Follow-up in 3 months for reevaluation.  - buPROPion (WELLBUTRIN XL) 150 MG 24 hr tablet; Take 1 tablet (150 mg) by mouth every morning             Work on weight loss  Regular exercise    Homar Hubbard MD  [facility]   Leesa is a 43 year old, presenting for the following health issues:  Depression      8/24/2023     9:31 AM   Additional Questions   Roomed by Vandana       History of Present Illness       Mental Health Follow-up:  Patient presents to follow-up on Depression.Patient's depression since last visit has been:  Bad  The patient is not having other symptoms associated with depression.      Any significant life events: other  Patient is not feeling anxious or having panic attacks.  Patient has no concerns about alcohol or drug use.    She eats 4 or more servings of fruits and vegetables daily.She consumes 0 sweetened beverage(s) daily.She exercises with enough effort to increase her heart rate 30 to 60 minutes per day.  She exercises with enough effort to increase her heart rate 4 days per week.   She is taking medications regularly.               Review of Systems   Constitutional, HEENT, cardiovascular, pulmonary, gi and gu systems  are negative, except as otherwise noted.      Objective           Vitals:  No vitals were obtained today due to virtual visit.    Physical Exam   GENERAL: Healthy, alert and no distress  EYES: Eyes grossly normal to inspection.  No discharge or erythema, or obvious scleral/conjunctival abnormalities.  RESP: No audible wheeze, cough, or visible cyanosis.  No visible retractions or increased work of breathing.    SKIN: Visible skin clear. No significant rash, abnormal pigmentation or lesions.  NEURO: Cranial nerves grossly intact.  Mentation and speech appropriate for age.  PSYCH: Mentation appears normal, affect normal/bright, judgement and insight intact, normal speech and appearance well-groomed.                Video-Visit Details    Type of service:  Video Visit   Video Start Time: 12:57PM  Video End Time:1:17 PM    Originating Location (pt. Location): Home    Distant Location (provider location):  On-site  Platform used for Video Visit: Identyx

## 2023-08-29 DIAGNOSIS — M65.4 DE QUERVAIN'S SYNDROME (TENOSYNOVITIS): ICD-10-CM

## 2023-08-31 RX ORDER — INDOMETHACIN 25 MG/1
25 CAPSULE ORAL 2 TIMES DAILY PRN
Qty: 60 CAPSULE | Refills: 0 | Status: SHIPPED | OUTPATIENT
Start: 2023-08-31 | End: 2023-12-04

## 2023-09-10 ENCOUNTER — HEALTH MAINTENANCE LETTER (OUTPATIENT)
Age: 43
End: 2023-09-10

## 2023-09-20 ENCOUNTER — THERAPY VISIT (OUTPATIENT)
Dept: PHYSICAL THERAPY | Facility: CLINIC | Age: 43
End: 2023-09-20
Payer: COMMERCIAL

## 2023-09-20 DIAGNOSIS — M54.41 CHRONIC RIGHT-SIDED LOW BACK PAIN WITH RIGHT-SIDED SCIATICA: Primary | ICD-10-CM

## 2023-09-20 DIAGNOSIS — G89.29 CHRONIC RIGHT-SIDED LOW BACK PAIN WITH RIGHT-SIDED SCIATICA: Primary | ICD-10-CM

## 2023-09-20 PROCEDURE — 97110 THERAPEUTIC EXERCISES: CPT | Mod: GP | Performed by: PHYSICAL THERAPIST

## 2023-09-20 PROCEDURE — 97140 MANUAL THERAPY 1/> REGIONS: CPT | Mod: GP | Performed by: PHYSICAL THERAPIST

## 2023-09-20 NOTE — PROGRESS NOTES
09/20/23 0500   Appointment Info   Signing clinician's name / credentials Arianna Kate, PT, DPT, OCS   Total/Authorized Visits (E&T) 12   Visits Used 4   Medical Diagnosis Chronic Low Back Pain   PT Tx Diagnosis Low Back Pain, segmental hypomobility. weakness   Quick Adds Certification   Progress Note/Certification   Start of Care Date 06/26/23   Onset of illness/injury or Date of Surgery 06/26/23  (couple year history.)   Therapy Frequency 1-3x/month   Predicted Duration 12 weeks  (delay in rehab appointments)   Certification date from 09/20/23   Certification date to 12/19/23   Progress Note Due Date 09/18/23   GOALS   PT Goals 2   PT Goal 1   Goal Identifier LTG 1   Goal Description Pt will be able to lift 25# floor to waist as needed for daily ADL's and care of her son with no more than 2/10 pain.   Goal Progress 25# 3 x 5 reps, tentative -- ADVANCE TO 40# for functional strength for daily tasks   Target Date 09/18/23   Date Met 09/20/23   PT Goal 2   Goal Identifier LTG 2   Goal Description Pt will be able to sit for at least 1 hour at a time for increased tolerance to leisure and social activities   Goal Progress doesn't sit for long due to pain and active life.   Target Date 09/18/23   Subjective Report   Subjective Report Reports sx are improved, more intermittent and less painful. Hurting a little today after cleaning the turtle's cage 2 days ago. Has not done deadlift due to tentative on heavy weights and doesn't have a barbell   Objective Measures   Objective Measures Objective Measure 1;Objective Measure 2   Objective Measure 1   Objective Measure Deadlift   Details 25#, 3 x 5 reps   Objective Measure 2   Objective Measure Lumbar AROM   Details sidebend more painful R>L, limited approx 25%. Flexion full but mild pain on return from flexion, extension limited approx 25%.   Treatment Interventions (PT)   Interventions Therapeutic Procedure/Exercise;Manual Therapy   Therapeutic Procedure/Exercise  "  Therapeutic Procedures: strength, endurance, ROM, flexibillity minutes (57716) 25   Therapeutic Procedures Ther Proc 2   Ther Proc 1 Back ed   Ther Proc 1 - Details \"sore but safe\" ed and progressive overload   Ther Proc 2 Abdominal Brace Transverse Abdominis   PTRx Ther Proc 1 Supine Lumbar Hip Roll   PTRx Ther Proc 2 Static Flexion Rotation in Side lying with Pillows   PTRx Ther Proc 2 - Details Ed on stretches for sx modulation when back flares up. x 20s RLE following manual   PTRx Ther Proc 3 Piriformis Stretch Below 90 Degrees Supine   PTRx Ther Proc 4 \"messy lifting\"   PTRx Ther Proc 4 - Details 12# MB roll, D2 lift from floor to opposite shoulder x 20 total   PTRx Ther Proc 5 Ball Exercise Seated Pelvic Tilt   PTRx Ther Proc 6 Ball Exercise Supine Marching   PTRx Ther Proc 7 Pallof Press   PTRx Ther Proc 8 Dumbbell Armenian Dead Lift - Bilateral   PTRx Ther Proc 8 - Details 2 x 10 x 10#, 3 x 5 x 25# with ed on using DB or crate as needed. Back is strong ed, made to lift/move/bend. Ed on progressive overload   Skilled Intervention targeting posterior chain strength (deadlift) and progressive overload, paradigm shift towards preparedness   Patient Response/Progress Tolerated well, verbalizes understanding of SAID principle   Manual Therapy   Manual Therapy: Mobilization, MFR, MLD, friction massage minutes (99931) 8   Manual Therapy Manual Therapy 2   Manual Therapy 1 CR mid lumbar rotation   Manual Therapy 1 - Details x 5 each, L and R sidelying   Manual Therapy 2 gr 2 rotational mobs   Manual Therapy 2 - Details pt sidelying L and R   Skilled Intervention targeting decreased pain with sidebend and rotation   Patient Response/Progress decreased pain   Education   Learner/Method Patient;No Barriers to Learning   Plan   Home program online, 3-4x/week   Updates to plan of care + loaded deadlift   Plan for next session HEP warmup, progress deadlift weight, thoracic extensions MB, paloff twists (light)   Total " Session Time   Timed Code Treatment Minutes 33   Total Treatment Time (sum of timed and untimed services) 33       [facility]                                                                                   OUTPATIENT PHYSICAL THERAPY    PLAN OF TREATMENT FOR OUTPATIENT REHABILITATION   Patient's Last Name, First Name, Leesa Corbin YOB: 1980   Provider's Name   [facility]   Medical Record No.  [id number]     Onset Date: 06/26/23 (couple year history.)  Start of Care Date: 06/26/23     Medical Diagnosis:  Chronic Low Back Pain      PT Treatment Diagnosis:  Low Back Pain, segmental hypomobility. weakness Plan of Treatment  Frequency/Duration: (P) 1-3x/month/ (P) 12 weeks (delay in rehab appointments)    Certification date from (P) 09/20/23 to (P) 12/19/23         See note for plan of treatment details and functional goals     HERLINDA ALMANZA, PT                         I CERTIFY THE NEED FOR THESE SERVICES FURNISHED UNDER        THIS PLAN OF TREATMENT AND WHILE UNDER MY CARE .             Physician Signature               Date    X_____________________________________________________                    Referring Provider:  Amanda An      Initial Assessment  See Epic Evaluation- Start of Care Date: 06/26/23            PLAN  Continue therapy per current plan of care.  Delay in rehab, anticipate 6 weeks of progressive overload focus for posterior chain functional strength    Beginning/End Dates of Progress Note Reporting Period:  eval  to 09/20/2023    Referring Provider:  Amanda An

## 2023-09-27 PROBLEM — M65.4 DE QUERVAIN'S SYNDROME (TENOSYNOVITIS): Status: RESOLVED | Noted: 2023-07-12 | Resolved: 2023-09-27

## 2023-09-27 PROBLEM — M25.531 RIGHT WRIST PAIN: Status: RESOLVED | Noted: 2023-07-12 | Resolved: 2023-09-27

## 2023-10-04 ENCOUNTER — THERAPY VISIT (OUTPATIENT)
Dept: PHYSICAL THERAPY | Facility: CLINIC | Age: 43
End: 2023-10-04
Payer: COMMERCIAL

## 2023-10-04 DIAGNOSIS — M54.41 CHRONIC RIGHT-SIDED LOW BACK PAIN WITH RIGHT-SIDED SCIATICA: Primary | ICD-10-CM

## 2023-10-04 DIAGNOSIS — G89.29 CHRONIC RIGHT-SIDED LOW BACK PAIN WITH RIGHT-SIDED SCIATICA: Primary | ICD-10-CM

## 2023-10-04 PROCEDURE — 97110 THERAPEUTIC EXERCISES: CPT | Mod: GP | Performed by: PHYSICAL THERAPIST

## 2023-12-04 ENCOUNTER — OFFICE VISIT (OUTPATIENT)
Dept: FAMILY MEDICINE | Facility: CLINIC | Age: 43
End: 2023-12-04
Payer: COMMERCIAL

## 2023-12-04 VITALS
OXYGEN SATURATION: 100 % | HEIGHT: 67 IN | DIASTOLIC BLOOD PRESSURE: 73 MMHG | SYSTOLIC BLOOD PRESSURE: 106 MMHG | BODY MASS INDEX: 28.46 KG/M2 | HEART RATE: 63 BPM | TEMPERATURE: 97.9 F | RESPIRATION RATE: 14 BRPM

## 2023-12-04 DIAGNOSIS — Z12.31 ENCOUNTER FOR SCREENING MAMMOGRAM FOR BREAST CANCER: ICD-10-CM

## 2023-12-04 DIAGNOSIS — R20.2 TINGLING: Primary | ICD-10-CM

## 2023-12-04 PROCEDURE — 99213 OFFICE O/P EST LOW 20 MIN: CPT | Performed by: NURSE PRACTITIONER

## 2023-12-04 ASSESSMENT — ENCOUNTER SYMPTOMS
CHEST TIGHTNESS: 0
UNEXPECTED WEIGHT CHANGE: 0
COLOR CHANGE: 0
FEVER: 0
BREAST MASS: 0

## 2023-12-04 ASSESSMENT — PAIN SCALES - GENERAL: PAINLEVEL: NO PAIN (0)

## 2023-12-04 NOTE — PROGRESS NOTES
"  Assessment & Plan     1. Tingling  -Differentials considered include neuropathy, prodrome of herpes zoster, or normal response to fluctuating hormones of menstruation. No risk factors for neuropathy. Exam benign so no concerns for infection or complication of implants. If due to hormones will probably subside in several weeks.  Instructed patient to return to clinic if she develops a blistering rash or if symptoms worsen as this could be sign of shingles outbreak.k     2. Encounter for screening mammogram for breast cancer  -has not had a mammogram screening yet so will order.   - *MA Screening Digital Bilateral; Future      Ordering of each unique test           SOURAV Martinez CNP  [facility]    Alicia Landers is a 43 year old, presenting for the following health issues:  Breast Problem        12/4/2023    10:46 AM   Additional Questions   Roomed by Casie THOMSON   Accompanied by self       Tingling in right breast for one week off and on. No pain, \"weird feeling.\"     No family history of breast cancer.     No neck pain. Not breast feeding. No rashes.     Has breast implants placed 2009.     No history of shingles.     Had chickenpox as a kid.     History of Present Illness       Reason for visit:  Breast  Symptom onset:  3-7 days ago    She eats 4 or more servings of fruits and vegetables daily.She consumes 1 sweetened beverage(s) daily.She exercises with enough effort to increase her heart rate 30 to 60 minutes per day.  She exercises with enough effort to increase her heart rate 6 days per week.   She is taking medications regularly.           Review of Systems   Constitutional:  Negative for fever and unexpected weight change.   Respiratory:  Negative for chest tightness.    Cardiovascular:  Negative for chest pain and peripheral edema.   Breasts:  Negative for tenderness, breast mass and discharge.   Skin:  Negative for color change and rash.            Objective    /73 " "  Pulse 63   Temp 97.9  F (36.6  C) (Tympanic)   Resp 14   Ht 1.689 m (5' 6.5\")   LMP  (LMP Unknown)   SpO2 100%   BMI 28.46 kg/m    Body mass index is 28.46 kg/m .  Physical Exam  Constitutional:       Appearance: Normal appearance.   HENT:      Right Ear: External ear normal.      Left Ear: External ear normal.   Eyes:      Pupils: Pupils are equal, round, and reactive to light.   Cardiovascular:      Rate and Rhythm: Normal rate.   Pulmonary:      Effort: Pulmonary effort is normal.   Chest:      Chest wall: No mass, deformity, swelling, tenderness, crepitus or edema.   Breasts:     Breasts are symmetrical.      Right: No swelling, bleeding, inverted nipple, mass, nipple discharge, skin change or tenderness.      Left: No swelling, bleeding, inverted nipple, mass, nipple discharge, skin change or tenderness.      Comments: Bilateral breast implants present, no additional masses palpated  Lymphadenopathy:      Upper Body:      Right upper body: No supraclavicular adenopathy.      Left upper body: No supraclavicular adenopathy.   Skin:     General: Skin is warm and dry.      Findings: No rash.   Neurological:      General: No focal deficit present.      Mental Status: She is alert and oriented to person, place, and time.   Psychiatric:         Mood and Affect: Mood normal.         Behavior: Behavior normal.         Thought Content: Thought content normal.         Judgment: Judgment normal.                  "

## 2024-01-22 ENCOUNTER — ANCILLARY PROCEDURE (OUTPATIENT)
Dept: MAMMOGRAPHY | Facility: CLINIC | Age: 44
End: 2024-01-22
Attending: NURSE PRACTITIONER
Payer: COMMERCIAL

## 2024-01-22 DIAGNOSIS — Z12.31 ENCOUNTER FOR SCREENING MAMMOGRAM FOR BREAST CANCER: ICD-10-CM

## 2024-01-22 PROCEDURE — 77067 SCR MAMMO BI INCL CAD: CPT | Mod: TC | Performed by: RADIOLOGY

## 2024-03-24 ENCOUNTER — OFFICE VISIT (OUTPATIENT)
Dept: URGENT CARE | Facility: URGENT CARE | Age: 44
End: 2024-03-24
Payer: COMMERCIAL

## 2024-03-24 VITALS
TEMPERATURE: 100.3 F | HEART RATE: 107 BPM | DIASTOLIC BLOOD PRESSURE: 78 MMHG | SYSTOLIC BLOOD PRESSURE: 126 MMHG | OXYGEN SATURATION: 97 % | RESPIRATION RATE: 22 BRPM

## 2024-03-24 DIAGNOSIS — T17.908A ASPIRATION OF FOREIGN BODY IN RESPIRATORY TRACT, INITIAL ENCOUNTER: Primary | ICD-10-CM

## 2024-03-24 PROCEDURE — 99213 OFFICE O/P EST LOW 20 MIN: CPT | Performed by: STUDENT IN AN ORGANIZED HEALTH CARE EDUCATION/TRAINING PROGRAM

## 2024-03-24 NOTE — PROGRESS NOTES
Assessment & Plan     Aspiration of foreign body in respiratory tract, initial encounter  Leesa is a 3-year-old who presents to urgent care after choking on a preworkout powder 6 to 7 hours ago.  Since then she has been symptomatic with a cough, abdominal pain, body aches, chills, fatigue.  Physical examination is reassuring at this time though she does have a mildly elevated pulse and respiratory rate and borderline for fever.  Discussed due to how recently she inhaled the powder that she likely will not have changes on her x-ray especially considering how fine the particles are.  Recommended that we continue to monitor her symptoms given her reassuring physical exam.  Extensively discussed signs and symptoms to look for that would be signs of an aspiration pneumonia that these would likely worsen over the next 1 to 2 days if pneumonia is present.      22 minutes spent by me on the date of the encounter doing chart review, history and exam, documentation and further activities per the note.  Billed based on complexity of care.    No follow-ups on file.    Aster Mahajan MD  [facility] [place]    Subjective     Leesa is a 43 year old female who presents to clinic today for the following health issues:  Chief Complaint   Patient presents with    Urgent Care     Reports around 10am this morning she aspirated a powder called (skinny stick) before her workout. States since then she developed a cough, stomach pain, body aches, chills and fatigue.      HPI    Taking pre-workout this morning and started choking and doesn't feel right. Has a headache, stomach hurts, has the chills and feels tired. Started at 10 AM this morning, felt completely fine prior to this. Previously was coughing up the greenish powder that she had consumed. Right after had stuffy nose, shortness of breath wheezing, runny nose, sore throat and lightheadedness, chest pain,  these symptoms have resolved for the most  part.     URI Adult    Onset of symptoms was 7 hour(s) ago.  Course of illness is worsening.    Severity moderate  Current and Associated symptoms: fever, cough - productive, headache, fatigue, abdominal pain and nausea  Treatment measures tried include increased fluid intake.  Predisposing factors include None    Review of Systems  Constitutional, HEENT, cardiovascular, pulmonary, gi and gu systems are negative, except as otherwise noted.      Objective    /78   Pulse 107   Temp 100.3  F (37.9  C) (Tympanic)   Resp 22   SpO2 97%   Physical Exam   GENERAL: alert and no distress  EYES: Eyes grossly normal to inspection, PERRL and conjunctivae and sclerae normal  HENT: ear canals and TM's normal, nose and mouth without ulcers or lesions  NECK: no adenopathy, no asymmetry, masses, or scars  RESP: lungs clear to auscultation - no rales, rhonchi or wheezes  CV: regular rate and rhythm, normal S1 S2, no S3 or S4, no murmur, click or rub, no peripheral edema  ABDOMEN: soft, nontender, no hepatosplenomegaly, no masses and bowel sounds normal  MS: no gross musculoskeletal defects noted, no edema  SKIN: no suspicious lesions or rashes

## 2024-05-07 ENCOUNTER — OFFICE VISIT (OUTPATIENT)
Dept: FAMILY MEDICINE | Facility: CLINIC | Age: 44
End: 2024-05-07
Payer: COMMERCIAL

## 2024-05-07 VITALS
OXYGEN SATURATION: 100 % | TEMPERATURE: 98 F | HEART RATE: 61 BPM | DIASTOLIC BLOOD PRESSURE: 68 MMHG | HEIGHT: 67 IN | RESPIRATION RATE: 18 BRPM | SYSTOLIC BLOOD PRESSURE: 108 MMHG | BODY MASS INDEX: 28.46 KG/M2

## 2024-05-07 DIAGNOSIS — R21 RASH AND NONSPECIFIC SKIN ERUPTION: ICD-10-CM

## 2024-05-07 DIAGNOSIS — I73.00 RAYNAUD'S DISEASE WITHOUT GANGRENE: ICD-10-CM

## 2024-05-07 DIAGNOSIS — Z78.9 VEGAN DIET: Primary | ICD-10-CM

## 2024-05-07 LAB
ANION GAP SERPL CALCULATED.3IONS-SCNC: 8 MMOL/L (ref 7–15)
BUN SERPL-MCNC: 15.9 MG/DL (ref 6–20)
CALCIUM SERPL-MCNC: 9 MG/DL (ref 8.6–10)
CHLORIDE SERPL-SCNC: 104 MMOL/L (ref 98–107)
CREAT SERPL-MCNC: 0.79 MG/DL (ref 0.51–0.95)
DEPRECATED HCO3 PLAS-SCNC: 24 MMOL/L (ref 22–29)
EGFRCR SERPLBLD CKD-EPI 2021: >90 ML/MIN/1.73M2
ERYTHROCYTE [DISTWIDTH] IN BLOOD BY AUTOMATED COUNT: 11.9 % (ref 10–15)
FERRITIN SERPL-MCNC: 184 NG/ML (ref 6–175)
GLUCOSE SERPL-MCNC: 93 MG/DL (ref 70–99)
HCT VFR BLD AUTO: 39.4 % (ref 35–47)
HGB BLD-MCNC: 12.9 G/DL (ref 11.7–15.7)
MAGNESIUM SERPL-MCNC: 2.1 MG/DL (ref 1.7–2.3)
MCH RBC QN AUTO: 29.1 PG (ref 26.5–33)
MCHC RBC AUTO-ENTMCNC: 32.7 G/DL (ref 31.5–36.5)
MCV RBC AUTO: 89 FL (ref 78–100)
PLATELET # BLD AUTO: 244 10E3/UL (ref 150–450)
POTASSIUM SERPL-SCNC: 4.2 MMOL/L (ref 3.4–5.3)
RBC # BLD AUTO: 4.43 10E6/UL (ref 3.8–5.2)
SODIUM SERPL-SCNC: 136 MMOL/L (ref 135–145)
VIT B12 SERPL-MCNC: 1091 PG/ML (ref 232–1245)
VIT D+METAB SERPL-MCNC: 33 NG/ML (ref 20–50)
WBC # BLD AUTO: 8.4 10E3/UL (ref 4–11)

## 2024-05-07 PROCEDURE — 99214 OFFICE O/P EST MOD 30 MIN: CPT | Performed by: NURSE PRACTITIONER

## 2024-05-07 PROCEDURE — 82607 VITAMIN B-12: CPT | Performed by: NURSE PRACTITIONER

## 2024-05-07 PROCEDURE — 80048 BASIC METABOLIC PNL TOTAL CA: CPT | Performed by: NURSE PRACTITIONER

## 2024-05-07 PROCEDURE — 83735 ASSAY OF MAGNESIUM: CPT | Performed by: NURSE PRACTITIONER

## 2024-05-07 PROCEDURE — 36415 COLL VENOUS BLD VENIPUNCTURE: CPT | Performed by: NURSE PRACTITIONER

## 2024-05-07 PROCEDURE — 82728 ASSAY OF FERRITIN: CPT | Performed by: NURSE PRACTITIONER

## 2024-05-07 PROCEDURE — 85027 COMPLETE CBC AUTOMATED: CPT | Performed by: NURSE PRACTITIONER

## 2024-05-07 PROCEDURE — 82306 VITAMIN D 25 HYDROXY: CPT | Performed by: NURSE PRACTITIONER

## 2024-05-07 RX ORDER — BENZOCAINE/MENTHOL 6 MG-10 MG
LOZENGE MUCOUS MEMBRANE 2 TIMES DAILY
COMMUNITY
Start: 2024-05-07

## 2024-05-07 ASSESSMENT — PAIN SCALES - GENERAL: PAINLEVEL: NO PAIN (0)

## 2024-05-07 NOTE — PROGRESS NOTES
Assessment & Plan     Vegan diet  -reassured that bumps on her head are normal bumps found where the skull plates suture together.  -unlikely to have vitamin deficiency if taking multivitamin and eating varied diet  - Vitamin B12  - Ferritin  - CBC with platelets  - Vitamin D Deficiency  - Basic metabolic panel  - Magnesium    Raynaud's disease without gangrene  -discussed symptom management including keeping hands warm, wearing gloves. If insufficient response or worsening symptoms can always trial beta blocker therapy such as propanolol.     Rash and nonspecific skin eruption  -differentials include irritant dermatitis, eczema.  -Avoid very long, hot showers. [place], cooler showers reduce water loss from the skin. Apply scent free moisturizing cream to the skin while skin is still damp to seal in moisture. Topical corticosteroids such as hydrocortisone can also be applied as needed for itch.   - hydrocortisone (CORTAID) 1 % external cream; Apply topically 2 times daily    Ordering of each unique test    Subjective   Leesa is a 43 year old, presenting for the following health issues:  No chief complaint on file.        5/7/2024    10:21 AM   Additional Questions   Roomed by Casie THOMSON   Accompanied by self     1. Rash on inner right arm for 5 days, not itchy or painful. Tried antibiotic/first aid ointment.     2. Fingers: when cold, will turn white and go numb.     3. Bumps on back of scalp-not painful or itchy or inflamed.     2. Going vegan, asking about vitamins. Taking Marquita Alvarez's women liquid multivitamin for past few weeks.     History of Present Illness       Reason for visit:  Numb fingers,vitamin def,rash  Symptom onset:  3-7 days ago    She eats 2-3 servings of fruits and vegetables daily.She consumes 1 sweetened beverage(s) daily.She exercises with enough effort to increase her heart rate 30 to 60 minutes per day.  She exercises with enough effort to increase her heart rate 5 days per week.   She is  "taking medications regularly.           Objective    /68   Pulse 61   Temp 98  F (36.7  C) (Tympanic)   Resp 18   Ht 1.689 m (5' 6.5\")   LMP  (LMP Unknown)   SpO2 100%   BMI 28.46 kg/m    Body mass index is 28.46 kg/m .  Physical Exam  Constitutional:       General: She is not in acute distress.     Appearance: Normal appearance.   HENT:      Right Ear: External ear normal.      Left Ear: External ear normal.   Eyes:      Pupils: Pupils are equal, round, and reactive to light.   Cardiovascular:      Rate and Rhythm: Normal rate.      Pulses: Normal pulses.      Heart sounds: Normal heart sounds. No murmur heard.     No friction rub. No gallop.   Pulmonary:      Effort: Pulmonary effort is normal. No respiratory distress.      Breath sounds: No wheezing, rhonchi or rales.   Skin:     General: Skin is warm and dry.          Neurological:      General: No focal deficit present.      Mental Status: She is alert and oriented to person, place, and time.   Psychiatric:         Mood and Affect: Mood normal.         Behavior: Behavior normal.         Thought Content: Thought content normal.         Judgment: Judgment normal.                    Signed Electronically by: SOURAV Martinez CNP    "

## 2024-05-07 NOTE — PATIENT INSTRUCTIONS
Avoid very long, hot showers. [place], cooler showers reduce water loss from the skin. Apply scent free moisturizing cream to the skin while skin is still damp to seal in moisture. Topical corticosteroids such as hydrocortisone can also be applied as needed for itch.

## 2024-06-26 ENCOUNTER — OFFICE VISIT (OUTPATIENT)
Dept: FAMILY MEDICINE | Facility: CLINIC | Age: 44
End: 2024-06-26
Payer: COMMERCIAL

## 2024-06-26 VITALS
SYSTOLIC BLOOD PRESSURE: 108 MMHG | RESPIRATION RATE: 20 BRPM | OXYGEN SATURATION: 99 % | HEIGHT: 67 IN | HEART RATE: 71 BPM | DIASTOLIC BLOOD PRESSURE: 71 MMHG | TEMPERATURE: 97.3 F

## 2024-06-26 DIAGNOSIS — R79.89 HIGH SERUM FERRITIN: Primary | ICD-10-CM

## 2024-06-26 DIAGNOSIS — Z13.6 CARDIOVASCULAR SCREENING; LDL GOAL LESS THAN 160: ICD-10-CM

## 2024-06-26 LAB
CHOLEST SERPL-MCNC: 111 MG/DL
ERYTHROCYTE [DISTWIDTH] IN BLOOD BY AUTOMATED COUNT: 11.8 % (ref 10–15)
FASTING STATUS PATIENT QL REPORTED: NO
FERRITIN SERPL-MCNC: 149 NG/ML (ref 6–175)
HCT VFR BLD AUTO: 41.9 % (ref 35–47)
HDLC SERPL-MCNC: 60 MG/DL
HGB BLD-MCNC: 13.6 G/DL (ref 11.7–15.7)
IRON BINDING CAPACITY (ROCHE): 261 UG/DL (ref 240–430)
IRON SATN MFR SERPL: 23 % (ref 15–46)
IRON SERPL-MCNC: 61 UG/DL (ref 37–145)
LDLC SERPL CALC-MCNC: 46 MG/DL
MCH RBC QN AUTO: 28.9 PG (ref 26.5–33)
MCHC RBC AUTO-ENTMCNC: 32.5 G/DL (ref 31.5–36.5)
MCV RBC AUTO: 89 FL (ref 78–100)
NONHDLC SERPL-MCNC: 51 MG/DL
PLATELET # BLD AUTO: 256 10E3/UL (ref 150–450)
RBC # BLD AUTO: 4.7 10E6/UL (ref 3.8–5.2)
TRIGL SERPL-MCNC: 25 MG/DL
WBC # BLD AUTO: 7.4 10E3/UL (ref 4–11)

## 2024-06-26 PROCEDURE — 83550 IRON BINDING TEST: CPT | Performed by: NURSE PRACTITIONER

## 2024-06-26 PROCEDURE — 85027 COMPLETE CBC AUTOMATED: CPT | Performed by: NURSE PRACTITIONER

## 2024-06-26 PROCEDURE — 82728 ASSAY OF FERRITIN: CPT | Performed by: NURSE PRACTITIONER

## 2024-06-26 PROCEDURE — 83540 ASSAY OF IRON: CPT | Performed by: NURSE PRACTITIONER

## 2024-06-26 PROCEDURE — 99213 OFFICE O/P EST LOW 20 MIN: CPT | Performed by: NURSE PRACTITIONER

## 2024-06-26 PROCEDURE — 80061 LIPID PANEL: CPT | Performed by: NURSE PRACTITIONER

## 2024-06-26 PROCEDURE — 36415 COLL VENOUS BLD VENIPUNCTURE: CPT | Performed by: NURSE PRACTITIONER

## 2024-06-26 ASSESSMENT — PAIN SCALES - GENERAL: PAINLEVEL: NO PAIN (0)

## 2024-06-26 NOTE — PROGRESS NOTES
"  Assessment & Plan     High serum ferritin  -Discussed that high ferritin can be a nonspecific sign of inflammation as well. Will recheck both ferritin and iron studies today.   - CBC with platelets  - Ferritin  - Iron and iron binding capacity    CARDIOVASCULAR SCREENING; LDL GOAL LESS THAN 160    - Lipid panel reflex to direct LDL Non-fasting          Subjective   Leesa is a 44 year old, presenting for the following health issues:  RECHECK        6/26/2024     8:46 AM   Additional Questions   Roomed by Casie THOMSON   Accompanied by self     Follow up after high ferritin level. She stopped taking her iron supplements and protein shakes that were high in iron. Is no longer vegan. Avoids milk as it upsets her stomach.     Slep and appetite okay.     Rare spotting on Mirena.     History of Present Illness       Reason for visit:  Iron level    She eats 4 or more servings of fruits and vegetables daily.She consumes 1 sweetened beverage(s) daily.She exercises with enough effort to increase her heart rate 20 to 29 minutes per day.  She exercises with enough effort to increase her heart rate 5 days per week.   She is taking medications regularly.             Objective    /71   Pulse 71   Temp 97.3  F (36.3  C) (Tympanic)   Resp 20   Ht 1.689 m (5' 6.5\")   LMP  (LMP Unknown)   SpO2 99%   There is no height or weight on file to calculate BMI.  Physical Exam  Constitutional:       General: She is not in acute distress.     Appearance: Normal appearance.   HENT:      Right Ear: External ear normal.      Left Ear: External ear normal.   Eyes:      Pupils: Pupils are equal, round, and reactive to light.   Cardiovascular:      Rate and Rhythm: Normal rate.      Pulses: Normal pulses.      Heart sounds: Normal heart sounds. No murmur heard.     No friction rub. No gallop.   Pulmonary:      Effort: Pulmonary effort is normal. No respiratory distress.      Breath sounds: No wheezing, rhonchi or rales.   Skin:     " General: Skin is warm and dry.   Neurological:      General: No focal deficit present.      Mental Status: She is alert and oriented to person, place, and time.   Psychiatric:         Mood and Affect: Mood normal.         Behavior: Behavior normal.         Thought Content: Thought content normal.         Judgment: Judgment normal.                    Signed Electronically by: SOURAV Martinez CNP

## 2024-07-18 ENCOUNTER — OFFICE VISIT (OUTPATIENT)
Dept: URGENT CARE | Facility: URGENT CARE | Age: 44
End: 2024-07-18
Payer: COMMERCIAL

## 2024-07-18 VITALS
BODY MASS INDEX: 27.82 KG/M2 | TEMPERATURE: 99.9 F | HEART RATE: 103 BPM | WEIGHT: 175 LBS | DIASTOLIC BLOOD PRESSURE: 70 MMHG | OXYGEN SATURATION: 100 % | SYSTOLIC BLOOD PRESSURE: 114 MMHG

## 2024-07-18 DIAGNOSIS — J06.9 VIRAL URI: Primary | ICD-10-CM

## 2024-07-18 DIAGNOSIS — R07.0 THROAT PAIN: ICD-10-CM

## 2024-07-18 DIAGNOSIS — R05.1 ACUTE COUGH: ICD-10-CM

## 2024-07-18 LAB
DEPRECATED S PYO AG THROAT QL EIA: NEGATIVE
GROUP A STREP BY PCR: NOT DETECTED

## 2024-07-18 PROCEDURE — 87635 SARS-COV-2 COVID-19 AMP PRB: CPT | Performed by: STUDENT IN AN ORGANIZED HEALTH CARE EDUCATION/TRAINING PROGRAM

## 2024-07-18 PROCEDURE — 99213 OFFICE O/P EST LOW 20 MIN: CPT | Performed by: STUDENT IN AN ORGANIZED HEALTH CARE EDUCATION/TRAINING PROGRAM

## 2024-07-18 PROCEDURE — 87651 STREP A DNA AMP PROBE: CPT | Performed by: STUDENT IN AN ORGANIZED HEALTH CARE EDUCATION/TRAINING PROGRAM

## 2024-07-18 ASSESSMENT — PAIN SCALES - GENERAL: PAINLEVEL: EXTREME PAIN (8)

## 2024-07-18 NOTE — PROGRESS NOTES
Assessment & Plan     Viral URI  Lab test rapid strep negative. Awaiting strep PCR result and will treat with antibiotic if this comes back positive. Awaiting result of Covid test and advised if it is positive that a Covid team nurse would call her to discuss treatment options. I advised treating for now for viral URI with supportive measures of rest, pushing fluids to avoid dehydration, OTC Tylenol or ibuprofen as needed per label directions for pain or fever. Discussed that symptoms of viral illnesses tend to be worst on days 2-4 then gradually improve over the course of 7-10 days. We also discussed that if the symptoms persist, I recommended patient is seen again by a provider either in urgent care or in family practice.     Throat pain  - Streptococcus A Rapid Screen w/Reflex to PCR - Clinic Collect  - Group A Streptococcus PCR Throat Swab    Acute cough  - Symptomatic COVID-19 Virus (Coronavirus) by PCR Nose         No follow-ups on file.    Marily Barahona, SOURAV CNP  [facility]     Leesa is a 44 year old female who presents to clinic today for the following health issues:  Chief Complaint   Patient presents with    Urgent Care    Throat Problem     X's 2 days no fever or body aches     Cough     Dry cough        HPI      Review of Systems  Constitutional, HEENT, cardiovascular, pulmonary, GI, , musculoskeletal, neuro, skin, endocrine and psych systems are negative, except as otherwise noted.      Objective    /70   Pulse 103   Temp 99.9  F (37.7  C) (Tympanic)   Wt 79.4 kg (175 lb)   SpO2 100%   BMI 27.82 kg/m    Physical Exam   GENERAL: alert and no distress  EYES: Eyes grossly normal to inspection, PERRL and conjunctivae and sclerae normal  HENT: ear canals and TM's normal, nose and mouth without ulcers or lesions  NECK: no adenopathy, no asymmetry, masses, or scars  RESP: lungs clear to auscultation - no rales, rhonchi or wheezes  CV: regular rate  and rhythm, normal S1 S2, no S3 or S4, no murmur, click or rub, no peripheral edema  MS: no gross musculoskeletal defects noted, no edema  SKIN: no suspicious lesions or rashes  NEURO: Normal strength and tone, mentation intact and speech normal  PSYCH: mentation appears normal, affect normal/bright    Results for orders placed or performed in visit on 07/18/24 (from the past 24 hour(s))   Streptococcus A Rapid Screen w/Reflex to PCR - Clinic Collect    Specimen: Throat; Swab   Result Value Ref Range    Group A Strep antigen Negative Negative

## 2024-07-19 ENCOUNTER — TELEPHONE (OUTPATIENT)
Dept: FAMILY MEDICINE | Facility: CLINIC | Age: 44
End: 2024-07-19
Payer: COMMERCIAL

## 2024-07-19 DIAGNOSIS — U07.1 INFECTION DUE TO 2019 NOVEL CORONAVIRUS: Primary | ICD-10-CM

## 2024-07-19 LAB — SARS-COV-2 RNA RESP QL NAA+PROBE: POSITIVE

## 2024-07-19 NOTE — TELEPHONE ENCOUNTER
Paxlovid send to the pharmacy.    RN COVID TREATMENT VISIT  07/19/24      The patient has been triaged and does not require a higher level of care.    Leesa Shah  44 year old  Current weight? 175    Has the patient been seen by a primary care provider at an [facility] or [facility] within the past two years? Yes.   Have you been in close proximity to/do you have a known exposure to a person with a confirmed case of influenza? No.     General treatment eligibility:  Date of positive COVID test (PCR or at home)?  7/18/2024    Are you or have you been hospitalized for this COVID-19 infection? No.   Have you received monoclonal antibodies or antiviral treatment for COVID-19 since this positive test? No.   Do you have any of the following conditions that place you at risk of being very sick from COVID-19?   - Overweight or Obesity (BMI >85th percentile or BMI 25 or higher)  Yes, patient has at least one high risk condition as noted above.     Current COVID symptoms:   - cough  - muscle or body aches  - headache  - sore throat  Yes. Patient has at least one symptom as selected.     How many days since symptoms started? 5 days or less. Established patient, 12 years or older weighing at least 88.2 lbs, who has symptoms that started in the past 5 days, has not been hospitalized nor received treatment already, and is at risk for being very sick from COVID-19.     Treatment eligibility by RN:  Are you currently pregnant or nursing? No  Do you have a clinically significant hypersensitivity to nirmatrelvir or ritonavir, or toxic epidermal necrolysis (TEN) or Espinoza-Kenny Syndrome? No  Do you have a history of hepatitis, any hepatic impairment on the Problem List (such as Child-Epstein Class C, cirrhosis, fatty liver disease, alcoholic liver disease), or was the last liver lab (hepatic panel, ALT, AST, ALK Phos, bilirubin) elevated in the past 6 months? No  Do you have any history of severe  renal impairment (eGFR < 30mL/min)? No    Is patient eligible to continue? Yes, patient meets all eligibility requirements for the RN COVID treatment (as denoted by all no responses above).     Current Outpatient Medications   Medication Sig Dispense Refill    hydrocortisone (CORTAID) 1 % external cream Apply topically 2 times daily      hydrOXYzine (ATARAX) 25 MG tablet Take 1 tablet (25 mg) by mouth every 8 hours as needed for itching 90 tablet 1    levonorgestrel (MIRENA) 52 MG (20 mcg/day) IUD by Intrauterine route once         Medications from List 1 of the standing order (on medications that exclude the use of Paxlovid) that patient is taking: NONE. Is patient taking Ant's Wort? No  Is patient taking Ashdown's Wort or any meds from List 1? No.   Medications from List 2 of the standing order (on meds that provider needs to adjust) that patient is taking: NONE. Is patient on any of the meds from List 2? No.   Medications from List 3 of standing order (on meds that a RN needs to adjust) that patient is taking: NONE. Is patient on any meds from List 3? No.     Paxlovid has an approximate 90% reduction in hospitalization. Paxlovid can possibly cause altered sense of taste, diarrhea (loose, watery stools), high blood pressure, muscle aches.     Would patient like a Paxlovid prescription?   Yes.   Lab Results   Component Value Date    GFRESTIMATED >90 05/07/2024       Was last eGFR reduced? No, eGFR 60 or greater/ No Result on record. Patient can receive the normal renal function dose. Paxlovid Rx sent to [id number] - [place], [place] - [id number]     Temporary change to home medications: None    All medication adjustments (holds, etc) were discussed with the patient and patient was asked to repeat back (teachback) their med adjustment.  Did patient understand med adjustment? Yes, patient repeated back and understood  correctly.        Reviewed the following instructions with the patient:    Paxlovid (nimatrelvir and ritonavir)    How it works  Two medicines (nirmatrelvir and ritonavir) are taken together. They stop the virus from growing. Less amount of virus is easier for your body to fight.    How to take  Medicine comes in a daily container with both medicine tablets. Take by mouth twice daily (once in the morning, once at night) for 5 days.  The number of tablets to take varies by patient.  Don't chew or break capsules. Swallow whole.    When to take  Take as soon as possible after positive COVID-19 test result, and within 5 days of your first symptoms.    Possible side effects  Can cause altered sense of taste, diarrhea (loose, watery stools), high blood pressure, muscle aches.    Nini Cast RN

## 2024-09-10 ENCOUNTER — OFFICE VISIT (OUTPATIENT)
Dept: FAMILY MEDICINE | Facility: CLINIC | Age: 44
End: 2024-09-10
Payer: COMMERCIAL

## 2024-09-10 VITALS
RESPIRATION RATE: 16 BRPM | HEART RATE: 71 BPM | BODY MASS INDEX: 28.25 KG/M2 | HEIGHT: 66 IN | SYSTOLIC BLOOD PRESSURE: 113 MMHG | OXYGEN SATURATION: 97 % | TEMPERATURE: 98.3 F | DIASTOLIC BLOOD PRESSURE: 51 MMHG

## 2024-09-10 DIAGNOSIS — I73.9 INTERMITTENT CLAUDICATION (H): Primary | ICD-10-CM

## 2024-09-10 PROCEDURE — 99213 OFFICE O/P EST LOW 20 MIN: CPT | Performed by: NURSE PRACTITIONER

## 2024-09-10 ASSESSMENT — PAIN SCALES - GENERAL: PAINLEVEL: MODERATE PAIN (5)

## 2024-09-10 NOTE — PROGRESS NOTES
"  Assessment & Plan     Intermittent claudication (H24)  -Symptoms are consistent with intermittent claudication of left calf, quickly relieved with rest. No swelling, redness, discoloration, recent travel or immobilization that would increase risk for VTE. Discussed warning signs to seek medical attention or go to ED immediately- severe worsening pain, redness, swelling of calf/leg, chest pain, or shortness of breath as these could be signs of a blood clot.   - US CODIE Doppler with Exercise Bilateral; Future  -Other possible causes could be impacted venous return due to presence of varicose veins, discussed if this is cause recommended treatment is compression stockings, if no improvement possible referral to vein specialist.                 Alicia Landers is a 44 year old, presenting for the following health issues:  Musculoskeletal Problem (LFT Calf pain )      9/10/2024     2:42 PM   Additional Questions   Roomed by nelia   Accompanied by son     Left calf started hurting about a week ago with walking. No pain with palpation. Aching. No recent travel or long trips. No known injury, no history of surgeries to the leg. Exercises on a regular bases. No swelling that she's noticed. Pain goes away after stopping and resting.         Bilateral width 16.5     History of Present Illness       Reason for visit:  Left calf pain  Symptom onset:  1-2 weeks ago   She is taking medications regularly.             Objective    /51   Pulse 71   Temp 98.3  F (36.8  C) (Oral)   Resp 16   Ht 1.676 m (5' 6\")   SpO2 97%   BMI 28.25 kg/m    Body mass index is 28.25 kg/m .  Physical Exam  Constitutional:       General: She is not in acute distress.     Appearance: Normal appearance.   HENT:      Right Ear: External ear normal.      Left Ear: External ear normal.   Eyes:      Pupils: Pupils are equal, round, and reactive to light.   Cardiovascular:      Rate and Rhythm: Normal rate.      Pulses: Normal pulses.      " Heart sounds: Normal heart sounds. No murmur heard.     No friction rub. No gallop.   Pulmonary:      Effort: Pulmonary effort is normal. No respiratory distress.      Breath sounds: No wheezing, rhonchi or rales.   Musculoskeletal:      Right lower leg: Normal. No swelling or tenderness. No edema.      Left lower leg: Normal. No swelling or tenderness. No edema.      Right ankle: Normal.      Left ankle: Normal.      Right foot: Normal.      Left foot: Normal.   Skin:     General: Skin is warm and dry.   Neurological:      General: No focal deficit present.      Mental Status: She is alert and oriented to person, place, and time.   Psychiatric:         Mood and Affect: Mood normal.         Behavior: Behavior normal.         Thought Content: Thought content normal.         Judgment: Judgment normal.                  Signed Electronically by: SOURAV Martinez CNP

## 2024-09-12 ENCOUNTER — ANCILLARY PROCEDURE (OUTPATIENT)
Dept: ULTRASOUND IMAGING | Facility: CLINIC | Age: 44
End: 2024-09-12
Attending: NURSE PRACTITIONER
Payer: COMMERCIAL

## 2024-09-12 DIAGNOSIS — I73.9 INTERMITTENT CLAUDICATION (H): ICD-10-CM

## 2024-09-12 PROCEDURE — 93971 EXTREMITY STUDY: CPT | Mod: TC | Performed by: RADIOLOGY

## 2024-09-13 ENCOUNTER — MYC MEDICAL ADVICE (OUTPATIENT)
Dept: FAMILY MEDICINE | Facility: CLINIC | Age: 44
End: 2024-09-13
Payer: COMMERCIAL

## 2024-10-10 ENCOUNTER — ANCILLARY PROCEDURE (OUTPATIENT)
Dept: ULTRASOUND IMAGING | Facility: CLINIC | Age: 44
End: 2024-10-10
Attending: NURSE PRACTITIONER
Payer: COMMERCIAL

## 2024-10-10 PROCEDURE — 93924 LWR XTR VASC STDY BILAT: CPT | Performed by: RADIOLOGY

## 2024-10-28 ENCOUNTER — OFFICE VISIT (OUTPATIENT)
Dept: FAMILY MEDICINE | Facility: CLINIC | Age: 44
End: 2024-10-28
Payer: COMMERCIAL

## 2024-10-28 VITALS
DIASTOLIC BLOOD PRESSURE: 63 MMHG | SYSTOLIC BLOOD PRESSURE: 106 MMHG | WEIGHT: 195.2 LBS | HEIGHT: 66 IN | RESPIRATION RATE: 18 BRPM | BODY MASS INDEX: 31.37 KG/M2 | OXYGEN SATURATION: 100 % | TEMPERATURE: 97.6 F | HEART RATE: 70 BPM

## 2024-10-28 DIAGNOSIS — E66.09 CLASS 1 OBESITY DUE TO EXCESS CALORIES WITHOUT SERIOUS COMORBIDITY WITH BODY MASS INDEX (BMI) OF 31.0 TO 31.9 IN ADULT: Primary | ICD-10-CM

## 2024-10-28 DIAGNOSIS — E66.811 CLASS 1 OBESITY DUE TO EXCESS CALORIES WITHOUT SERIOUS COMORBIDITY WITH BODY MASS INDEX (BMI) OF 31.0 TO 31.9 IN ADULT: Primary | ICD-10-CM

## 2024-10-28 PROCEDURE — 90480 ADMN SARSCOV2 VAC 1/ONLY CMP: CPT | Performed by: NURSE PRACTITIONER

## 2024-10-28 PROCEDURE — 90656 IIV3 VACC NO PRSV 0.5 ML IM: CPT | Performed by: NURSE PRACTITIONER

## 2024-10-28 PROCEDURE — 91320 SARSCV2 VAC 30MCG TRS-SUC IM: CPT | Performed by: NURSE PRACTITIONER

## 2024-10-28 PROCEDURE — 99213 OFFICE O/P EST LOW 20 MIN: CPT | Mod: 25 | Performed by: NURSE PRACTITIONER

## 2024-10-28 PROCEDURE — 90471 IMMUNIZATION ADMIN: CPT | Performed by: NURSE PRACTITIONER

## 2024-10-28 RX ORDER — TOPIRAMATE 25 MG/1
25 TABLET, FILM COATED ORAL DAILY
Qty: 30 TABLET | Refills: 2 | Status: SHIPPED | OUTPATIENT
Start: 2024-10-28

## 2024-10-28 RX ORDER — PHENTERMINE HYDROCHLORIDE 15 MG/1
15 CAPSULE ORAL EVERY MORNING
Qty: 30 CAPSULE | Refills: 2 | Status: SHIPPED | OUTPATIENT
Start: 2024-10-28

## 2024-10-28 ASSESSMENT — PAIN SCALES - GENERAL: PAINLEVEL_OUTOF10: NO PAIN (0)

## 2024-10-28 NOTE — PROGRESS NOTES
"  Assessment & Plan     Class 1 obesity due to excess calories without serious comorbidity with body mass index (BMI) of 31.0 to 31.9 in adult  -Discussed possible side effects and adverse effects including increased risk of hypertension, cardiovascular disease, anxiety, implications of controlled substance status.  The [place] Prescription Monitoring Program has been reviewed and there are no concerns about diversionary activity for controlled substances at this time.  - phentermine 15 MG capsule; Take 1 capsule (15 mg) by mouth every morning.  - topiramate (TOPAMAX) 25 MG tablet; Take 1 tablet (25 mg) by mouth daily.  -follow up 1-3 months for weight and bp check or sooner if needed     The longitudinal plan of care for the diagnosis(es)/condition(s) as documented were addressed during this visit. Due to the added complexity in care, I will continue to support Leesa in the subsequent management and with ongoing continuity of care.      BMI  Estimated body mass index is 31.75 kg/m  as calculated from the following:    Height as of this encounter: 1.67 m (5' 5.75\").    Weight as of this encounter: 88.5 kg (195 lb 3.2 oz).             Subjective   Leesa is a 44 year old, presenting for the following health issues:  weight loss management        10/28/2024     7:46 AM   Additional Questions   Roomed by Casie THOMSON   Accompanied by self     Patient presents to clinic today wanting to talk about weight loss medications.     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 States she knows how to eat healthy but gets stressed out and makes unhealthy choices.     Exercise: 4 days a week, cardio and strength training     Meals: 3 a day, couple snacks, no longer vegan.     Sleep: maybe 6 hours a night, son has difficulty falling asleep which interrupts her sleep as well.       ----------    Body mass index is 31.75 kg/m .    Wt Readings from Last 5 Encounters:  10/28/24 : 88.5 " "kg (195 lb 3.2 oz)  07/18/24 : 79.4 kg (175 lb)  06/25/14 : 81.2 kg (179 lb)  05/27/14 : 81.2 kg (179 lb)  05/05/14 : 78.9 kg (174 lb)      History of Present Illness       Reason for visit:  Weightloss medication   She is taking medications regularly.           Objective    /63   Pulse 70   Temp 97.6  F (36.4  C) (Tympanic)   Resp 18   Ht 1.67 m (5' 5.75\")   Wt 88.5 kg (195 lb 3.2 oz)   LMP  (LMP Unknown)   SpO2 100%   BMI 31.75 kg/m    Body mass index is 31.75 kg/m .  Physical Exam  Constitutional:       General: She is not in acute distress.     Appearance: Normal appearance.   HENT:      Right Ear: External ear normal.      Left Ear: External ear normal.   Eyes:      Pupils: Pupils are equal, round, and reactive to light.   Cardiovascular:      Rate and Rhythm: Normal rate.      Pulses: Normal pulses.      Heart sounds: Normal heart sounds. No murmur heard.     No friction rub. No gallop.   Pulmonary:      Effort: Pulmonary effort is normal. No respiratory distress.      Breath sounds: No wheezing, rhonchi or rales.   Skin:     General: Skin is warm and dry.   Neurological:      General: No focal deficit present.      Mental Status: She is alert and oriented to person, place, and time.   Psychiatric:         Mood and Affect: Mood normal.         Behavior: Behavior normal.         Thought Content: Thought content normal.         Judgment: Judgment normal.                    Signed Electronically by: SOURAV Martinez CNP    "

## 2024-11-03 ENCOUNTER — HEALTH MAINTENANCE LETTER (OUTPATIENT)
Age: 44
End: 2024-11-03

## 2024-11-27 ENCOUNTER — MYC REFILL (OUTPATIENT)
Dept: FAMILY MEDICINE | Facility: CLINIC | Age: 44
End: 2024-11-27
Payer: COMMERCIAL

## 2024-11-27 DIAGNOSIS — E66.09 CLASS 1 OBESITY DUE TO EXCESS CALORIES WITHOUT SERIOUS COMORBIDITY WITH BODY MASS INDEX (BMI) OF 31.0 TO 31.9 IN ADULT: ICD-10-CM

## 2024-11-27 DIAGNOSIS — E66.811 CLASS 1 OBESITY DUE TO EXCESS CALORIES WITHOUT SERIOUS COMORBIDITY WITH BODY MASS INDEX (BMI) OF 31.0 TO 31.9 IN ADULT: ICD-10-CM

## 2024-11-27 RX ORDER — PHENTERMINE HYDROCHLORIDE 15 MG/1
15 CAPSULE ORAL EVERY MORNING
Qty: 30 CAPSULE | Refills: 2 | OUTPATIENT
Start: 2024-11-27

## 2024-11-27 RX ORDER — TOPIRAMATE 25 MG/1
25 TABLET, FILM COATED ORAL DAILY
Qty: 30 TABLET | Refills: 2 | OUTPATIENT
Start: 2024-11-27

## 2024-12-28 ENCOUNTER — MYC REFILL (OUTPATIENT)
Dept: FAMILY MEDICINE | Facility: CLINIC | Age: 44
End: 2024-12-28
Payer: COMMERCIAL

## 2024-12-28 DIAGNOSIS — F41.9 ANXIETY: ICD-10-CM

## 2024-12-30 RX ORDER — HYDROXYZINE HYDROCHLORIDE 25 MG/1
25 TABLET, FILM COATED ORAL EVERY 8 HOURS PRN
Qty: 90 TABLET | Refills: 1 | Status: SHIPPED | OUTPATIENT
Start: 2024-12-30

## 2025-01-20 ENCOUNTER — PATIENT OUTREACH (OUTPATIENT)
Dept: FAMILY MEDICINE | Facility: CLINIC | Age: 45
End: 2025-01-20
Payer: COMMERCIAL

## 2025-01-20 NOTE — TELEPHONE ENCOUNTER
Patient Quality Outreach    Patient is due for the following:   Cervical Cancer Screening - PAP Needed  Physical Preventive Adult Physical      Topic Date Due    Hepatitis B Vaccine (1 of 3 - 19+ 3-dose series) Never done       Action(s) Taken:   Schedule a Adult Preventative    Type of outreach:    Sent ALENTY message.    Questions for provider review:    None           CHELSEA ADAM MA

## 2025-02-05 ENCOUNTER — OFFICE VISIT (OUTPATIENT)
Dept: FAMILY MEDICINE | Facility: CLINIC | Age: 45
End: 2025-02-05
Payer: COMMERCIAL

## 2025-02-05 VITALS
SYSTOLIC BLOOD PRESSURE: 102 MMHG | HEART RATE: 99 BPM | OXYGEN SATURATION: 100 % | TEMPERATURE: 97.2 F | WEIGHT: 180 LBS | BODY MASS INDEX: 29.99 KG/M2 | RESPIRATION RATE: 20 BRPM | DIASTOLIC BLOOD PRESSURE: 64 MMHG | HEIGHT: 65 IN

## 2025-02-05 DIAGNOSIS — Z12.4 CERVICAL CANCER SCREENING: ICD-10-CM

## 2025-02-05 DIAGNOSIS — E66.09 CLASS 1 OBESITY DUE TO EXCESS CALORIES WITHOUT SERIOUS COMORBIDITY WITH BODY MASS INDEX (BMI) OF 31.0 TO 31.9 IN ADULT: ICD-10-CM

## 2025-02-05 DIAGNOSIS — E66.811 CLASS 1 OBESITY DUE TO EXCESS CALORIES WITHOUT SERIOUS COMORBIDITY WITH BODY MASS INDEX (BMI) OF 31.0 TO 31.9 IN ADULT: ICD-10-CM

## 2025-02-05 DIAGNOSIS — Z00.00 ROUTINE GENERAL MEDICAL EXAMINATION AT A HEALTH CARE FACILITY: Primary | ICD-10-CM

## 2025-02-05 LAB
ALBUMIN SERPL BCG-MCNC: 4.1 G/DL (ref 3.5–5.2)
ALP SERPL-CCNC: 76 U/L (ref 40–150)
ALT SERPL W P-5'-P-CCNC: 21 U/L (ref 0–50)
ANION GAP SERPL CALCULATED.3IONS-SCNC: 10 MMOL/L (ref 7–15)
AST SERPL W P-5'-P-CCNC: 40 U/L (ref 0–45)
BILIRUB SERPL-MCNC: 0.6 MG/DL
BUN SERPL-MCNC: 13.3 MG/DL (ref 6–20)
CALCIUM SERPL-MCNC: 9.2 MG/DL (ref 8.8–10.4)
CHLORIDE SERPL-SCNC: 104 MMOL/L (ref 98–107)
CHOLEST SERPL-MCNC: 91 MG/DL
CREAT SERPL-MCNC: 0.82 MG/DL (ref 0.51–0.95)
EGFRCR SERPLBLD CKD-EPI 2021: >90 ML/MIN/1.73M2
ERYTHROCYTE [DISTWIDTH] IN BLOOD BY AUTOMATED COUNT: 11.7 % (ref 10–15)
FASTING STATUS PATIENT QL REPORTED: NO
FASTING STATUS PATIENT QL REPORTED: NO
GLUCOSE SERPL-MCNC: 83 MG/DL (ref 70–99)
HCO3 SERPL-SCNC: 23 MMOL/L (ref 22–29)
HCT VFR BLD AUTO: 44.4 % (ref 35–47)
HCV AB SERPL QL IA: NONREACTIVE
HDLC SERPL-MCNC: 38 MG/DL
HGB BLD-MCNC: 14.3 G/DL (ref 11.7–15.7)
HPV HR 12 DNA CVX QL NAA+PROBE: NEGATIVE
HPV16 DNA CVX QL NAA+PROBE: NEGATIVE
HPV18 DNA CVX QL NAA+PROBE: NEGATIVE
HUMAN PAPILLOMA VIRUS FINAL DIAGNOSIS: NORMAL
LDLC SERPL CALC-MCNC: 45 MG/DL
MCH RBC QN AUTO: 28.8 PG (ref 26.5–33)
MCHC RBC AUTO-ENTMCNC: 32.2 G/DL (ref 31.5–36.5)
MCV RBC AUTO: 90 FL (ref 78–100)
NONHDLC SERPL-MCNC: 53 MG/DL
PLATELET # BLD AUTO: 198 10E3/UL (ref 150–450)
POTASSIUM SERPL-SCNC: 4.4 MMOL/L (ref 3.4–5.3)
PROT SERPL-MCNC: 7.2 G/DL (ref 6.4–8.3)
RBC # BLD AUTO: 4.96 10E6/UL (ref 3.8–5.2)
SODIUM SERPL-SCNC: 137 MMOL/L (ref 135–145)
TRIGL SERPL-MCNC: 42 MG/DL
VIT D+METAB SERPL-MCNC: 52 NG/ML (ref 20–50)
WBC # BLD AUTO: 5.3 10E3/UL (ref 4–11)

## 2025-02-05 PROCEDURE — 36415 COLL VENOUS BLD VENIPUNCTURE: CPT | Performed by: NURSE PRACTITIONER

## 2025-02-05 PROCEDURE — 85027 COMPLETE CBC AUTOMATED: CPT | Performed by: NURSE PRACTITIONER

## 2025-02-05 PROCEDURE — 86803 HEPATITIS C AB TEST: CPT | Performed by: NURSE PRACTITIONER

## 2025-02-05 PROCEDURE — 80061 LIPID PANEL: CPT | Performed by: NURSE PRACTITIONER

## 2025-02-05 PROCEDURE — 99396 PREV VISIT EST AGE 40-64: CPT | Performed by: NURSE PRACTITIONER

## 2025-02-05 PROCEDURE — 82306 VITAMIN D 25 HYDROXY: CPT | Performed by: NURSE PRACTITIONER

## 2025-02-05 PROCEDURE — 87624 HPV HI-RISK TYP POOLED RSLT: CPT | Performed by: NURSE PRACTITIONER

## 2025-02-05 PROCEDURE — 80053 COMPREHEN METABOLIC PANEL: CPT | Performed by: NURSE PRACTITIONER

## 2025-02-05 SDOH — HEALTH STABILITY: PHYSICAL HEALTH: ON AVERAGE, HOW MANY DAYS PER WEEK DO YOU ENGAGE IN MODERATE TO STRENUOUS EXERCISE (LIKE A BRISK WALK)?: 6 DAYS

## 2025-02-05 SDOH — HEALTH STABILITY: PHYSICAL HEALTH: ON AVERAGE, HOW MANY MINUTES DO YOU ENGAGE IN EXERCISE AT THIS LEVEL?: 40 MIN

## 2025-02-05 ASSESSMENT — PAIN SCALES - GENERAL: PAINLEVEL_OUTOF10: NO PAIN (0)

## 2025-02-05 ASSESSMENT — SOCIAL DETERMINANTS OF HEALTH (SDOH): HOW OFTEN DO YOU GET TOGETHER WITH FRIENDS OR RELATIVES?: TWICE A WEEK

## 2025-02-05 NOTE — PATIENT INSTRUCTIONS
Patient Education   Preventive Care Advice   This is general advice given by our system to help you stay healthy. However, your care team may have specific advice just for you. Please talk to your care team about your preventive care needs.  Nutrition  Eat 5 or more servings of fruits and vegetables each day.  Try wheat bread, brown rice and whole grain pasta (instead of white bread, rice, and pasta).  Get enough calcium and vitamin D. Check the label on foods and aim for 100% of the RDA (recommended daily allowance).  Lifestyle  Exercise at least 150 minutes each week  (30 minutes a day, 5 days a week).  Do muscle strengthening activities 2 days a week. These help control your weight and prevent disease.  No smoking.  Wear sunscreen to prevent skin cancer.  Have a dental exam and cleaning every 6 months.  Yearly exams  See your health care team every year to talk about:  Any changes in your health.  Any medicines your care team has prescribed.  Preventive care, family planning, and ways to prevent chronic diseases.  Shots (vaccines)   HPV shots (up to age 26), if you've never had them before.  Hepatitis B shots (up to age 59), if you've never had them before.  COVID-19 shot: Get this shot when it's due.  Flu shot: Get a flu shot every year.  Tetanus shot: Get a tetanus shot every 10 years.  Pneumococcal, hepatitis A, and RSV shots: Ask your care team if you need these based on your risk.  Shingles shot (for age 50 and up)  General health tests  Diabetes screening:  Starting at age 35, Get screened for diabetes at least every 3 years.  If you are younger than age 35, ask your care team if you should be screened for diabetes.  Cholesterol test: At age 39, start having a cholesterol test every 5 years, or more often if advised.  Bone density scan (DEXA): At age 50, ask your care team if you should have this scan for osteoporosis (brittle bones).  Hepatitis C: Get tested at least once in your life.  STIs (sexually  transmitted infections)  Before age 24: Ask your care team if you should be screened for STIs.  After age 24: Get screened for STIs if you're at risk. You are at risk for STIs (including HIV) if:  You are sexually active with more than one person.  You don't use condoms every time.  You or a partner was diagnosed with a sexually transmitted infection.  If you are at risk for HIV, ask about PrEP medicine to prevent HIV.  Get tested for HIV at least once in your life, whether you are at risk for HIV or not.  Cancer screening tests  Cervical cancer screening: If you have a cervix, begin getting regular cervical cancer screening tests starting at age 21.  Breast cancer scan (mammogram): If you've ever had breasts, begin having regular mammograms starting at age 40. This is a scan to check for breast cancer.  Colon cancer screening: It is important to start screening for colon cancer at age 45.  Have a colonoscopy test every 10 years (or more often if you're at risk) Or, ask your provider about stool tests like a FIT test every year or Cologuard test every 3 years.  To learn more about your testing options, visit:   .  For help making a decision, visit:   https://bit.ly/yq19507.  Prostate cancer screening test: If you have a prostate, ask your care team if a prostate cancer screening test (PSA) at age 55 is right for you.  Lung cancer screening: If you are a current or former smoker ages 50 to 80, ask your care team if ongoing lung cancer screenings are right for you.  For informational purposes only. Not to replace the advice of your health care provider. Copyright   2023 [employer]. All rights reserved. Clinically reviewed by the [facility] [place] [facility] Transitions Program. [employer] [id number] - REV 01/24.

## 2025-02-05 NOTE — PROGRESS NOTES
"Preventive Care Visit  [facility]  SOURAV Martinez CNP, Family Medicine  Feb 5, 2025      Assessment & Plan     Routine general medical examination at a health care facility    - Hepatitis C Screen Reflex to HCV RNA Quant and Genotype    Cervical cancer screening    - HPV and Gynecologic Cytology Panel - Recommended Age 30 - 65 Years    Class 1 obesity due to excess calories without serious comorbidity with body mass index (BMI) of 31.0 to 31.9 in adult  -now on semaglutide from online service. Discussed importance of maintaining good exercise, weightlifting habits and protein intake to prevent muscle wasting.   - Comprehensive metabolic panel  - Vitamin D Deficiency  - Lipid panel reflex to direct LDL Non-fasting  - CBC with platelets            BMI  Estimated body mass index is 30.19 kg/m  as calculated from the following:    Height as of this encounter: 1.645 m (5' 4.75\").    Weight as of this encounter: 81.6 kg (180 lb).       Counseling  Appropriate preventive services were addressed with this patient via screening, questionnaire, or discussion as appropriate for fall prevention, nutrition, physical activity, Tobacco-use cessation, social engagement, weight loss and cognition.  Checklist reviewing preventive services available has been given to the patient.  Reviewed patient's diet, addressing concerns and/or questions.   The patient was instructed to see the dentist every 6 months.           Alicia Landers is a 44 year old, presenting for the following:  Physical        2/5/2025     8:47 AM   Additional Questions   Roomed by Casie THOMSON   Accompanied by self     Patient here for yearly preventative care visit. In addition, they have the following concerns:    1. Started semaglutide through [employer]. 3 meals a day, getting in protein. Exercise 40 min 5-6x week. Lifting weights, boxing, cardio, HIIT. Not having any side effects.         Non fasting visit    Health Care " Directive  Patient does not have a Health Care Directive: Discussed advance care planning with patient; however, patient declined at this time.      2/5/2025   General Health   How would you rate your overall physical health? Excellent   Feel stress (tense, anxious, or unable to sleep) Only a little   (!) STRESS CONCERN      2/5/2025   Nutrition   Three or more servings of calcium each day? Yes   Diet: Regular (no restrictions)   How many servings of fruit and vegetables per day? 4 or more   How many sweetened beverages each day? 0-1         2/5/2025   Exercise   Days per week of moderate/strenous exercise 6 days   Average minutes spent exercising at this level 40 min         2/5/2025   Social Factors   Frequency of gathering with friends or relatives Twice a week   Worry food won't last until get money to buy more Patient declined   Food not last or not have enough money for food? Patient declined   Do you have housing? (Housing is defined as stable permanent housing and does not include staying ouside in a car, in a tent, in an abandoned building, in an overnight shelter, or couch-surfing.) Patient declined   Are you worried about losing your housing? Patient declined   Lack of transportation? Patient declined   Unable to get utilities (heat,electricity)? Patient declined         2/5/2025   Dental   Dentist two times every year? (!) NO            Today's PHQ-2 Score:       2/5/2025     8:46 AM   PHQ-2 ( 1999 [employer])   Q1: Little interest or pleasure in doing things 0   Q2: Feeling down, depressed or hopeless 0   PHQ-2 Score 0    Q1: Little interest or pleasure in doing things Not at all   Q2: Feeling down, depressed or hopeless Not at all   PHQ-2 Score 0       Patient-reported           2/5/2025   Substance Use   Alcohol more than 3/day or more than 7/wk Not Applicable   Do you use any other substances recreationally? No     Social History     Tobacco Use     Smoking status: Former     Current packs/day: 0.00      "Average packs/day: 1 pack/day for 4.0 years (4.0 ttl pk-yrs)     Types: Cigarettes     Start date: 1997     Quit date: 2001     Years since quittin.8     Smokeless tobacco: Never     Tobacco comments:     quit smoking 01   Vaping Use     Vaping status: Never Used   Substance Use Topics     Alcohol use: No     Comment: quit when she became pregnant     Drug use: No           2024   LAST FHS-7 RESULTS   1st degree relative breast or ovarian cancer No   Any relative bilateral breast cancer No   Any male have breast cancer No   Any ONE woman have BOTH breast AND ovarian cancer No   Any woman with breast cancer before 50yrs No   2 or more relatives with breast AND/OR ovarian cancer No   2 or more relatives with breast AND/OR bowel cancer No        Mammogram Screening - Mammogram every 1-2 years updated in Health Maintenance based on mutual decision making        2025   STI Screening   New sexual partner(s) since last STI/HIV test? No     History of abnormal Pap smear: No - age 30- 64 PAP with HPV every 5 years recommended        2007    12:00 AM 2005    12:00 AM 2005    12:00 AM   PAP / HPV   PAP (Historical) NIL  NIL  NIL      ASCVD Risk   The ASCVD Risk score (Snow DK, et al., 2019) failed to calculate for the following reasons:    The valid total cholesterol range is 130 to 320 mg/dL        2025   Contraception/Family Planning   Questions about contraception or family planning No       Reviewed and updated as needed this visit by Provider   Tobacco   Meds  Problems  Med Hx  Surg Hx  Fam Hx  Soc Hx Sexual   Activity               Objective    Exam  /64   Pulse 99   Temp 97.2  F (36.2  C) (Tympanic)   Resp 20   Ht 1.645 m (5' 4.75\")   Wt 81.6 kg (180 lb)   SpO2 100%   BMI 30.19 kg/m     Estimated body mass index is 30.19 kg/m  as calculated from the following:    Height as of this encounter: 1.645 m (5' 4.75\").    Weight as of this encounter: " 81.6 kg (180 lb).    Physical Exam  Constitutional:       Appearance: Normal appearance. She is not ill-appearing.   HENT:      Right Ear: Tympanic membrane, ear canal and external ear normal.      Left Ear: Tympanic membrane, ear canal and external ear normal.      Nose: Nose normal. No congestion.      Mouth/Throat:      Mouth: Mucous membranes are moist.      Pharynx: Oropharynx is clear.   Eyes:      Pupils: Pupils are equal, round, and reactive to light.   Cardiovascular:      Rate and Rhythm: Normal rate and regular rhythm.      Pulses: Normal pulses.      Heart sounds: Normal heart sounds. No murmur heard.     No friction rub. No gallop.   Pulmonary:      Effort: Pulmonary effort is normal.      Breath sounds: Normal breath sounds.   Abdominal:      General: Abdomen is flat. Bowel sounds are normal.      Palpations: Abdomen is soft.   Genitourinary:     General: Normal vulva.      Labia:         Right: No rash, tenderness, lesion or injury.         Left: No rash, tenderness, lesion or injury.       Vagina: Normal. No vaginal discharge.      Cervix: Normal.      Comments: Iud strings visualized in appropriate position  Musculoskeletal:         General: Normal range of motion.      Cervical back: Normal range of motion.   Lymphadenopathy:      Cervical: No cervical adenopathy.   Skin:     General: Skin is warm and dry.   Neurological:      General: No focal deficit present.      Mental Status: She is alert and oriented to person, place, and time.   Psychiatric:         Mood and Affect: Mood normal.         Behavior: Behavior normal.         Thought Content: Thought content normal.         Judgment: Judgment normal.             Signed Electronically by: SOURAV Martinez CNP

## 2025-02-10 LAB
BKR AP ASSOCIATED HPV REPORT: NORMAL
BKR LAB AP GYN ADEQUACY: NORMAL
BKR LAB AP GYN INTERPRETATION: NORMAL
BKR LAB AP PREVIOUS ABNORMAL: NORMAL
PATH REPORT.COMMENTS IMP SPEC: NORMAL
PATH REPORT.COMMENTS IMP SPEC: NORMAL
PATH REPORT.RELEVANT HX SPEC: NORMAL

## 2025-03-14 NOTE — PATIENT INSTRUCTIONS
To Schedule an Appointment 24/7  Call: [phone]    If you have any questions regarding your visit, Please contact your care team.  [facility]: [phone]  [facility] HOURS TELEPHONE NUMBER   Cephas Agbeh, M.D.      Gladys Rice-Surgery Scheduler  [place]-Surgery Scheduler       Monday - [place]:    8:00 am-4:45 pm  Tuesday - [place]:   8:00 am-4:45 pm  Friday-[place]:       8:00 am-4:45 pm  Typical Surgery Day:  Wednesday [facility]   [address] [place] [address]   [phone]   Fax [phone]    Imaging Scheduling all locations  [phone]      [facility] Delivery   [address] [place] [address] [place] [address]   [phone]    [facility]  [address] [facility] [address]  [phone]  Fax [phone]   Urgent Care locations:  [facility] Monday-Friday                               10 am - 8 pm  Saturday and Sunday                      9 am - 5 pm  Monday-Friday                              10 am- 8 pm  Saturday and Sunday                      9 am - 5 pm    [phone]   **Surgeries** Our Surgery Schedulers will contact you to schedule. If you do not receive a call within 3 business days, please call [phone].  If you need a medication refill, please contact your pharmacy. Please allow 3 business days for your refill to be completed.  As always, Thank you for trusting us with your healthcare needs!  see additional instructions from your care team below

## 2025-03-18 ENCOUNTER — OFFICE VISIT (OUTPATIENT)
Dept: OBGYN | Facility: CLINIC | Age: 45
End: 2025-03-18
Payer: COMMERCIAL

## 2025-03-18 VITALS — DIASTOLIC BLOOD PRESSURE: 70 MMHG | SYSTOLIC BLOOD PRESSURE: 110 MMHG | HEART RATE: 64 BPM | OXYGEN SATURATION: 99 %

## 2025-03-18 DIAGNOSIS — Z30.017 NEXPLANON INSERTION: ICD-10-CM

## 2025-03-18 DIAGNOSIS — Z30.432 ENCOUNTER FOR IUD REMOVAL: Primary | ICD-10-CM

## 2025-03-18 PROCEDURE — 58301 REMOVE INTRAUTERINE DEVICE: CPT | Performed by: OBSTETRICS & GYNECOLOGY

## 2025-03-18 PROCEDURE — 11981 INSERTION DRUG DLVR IMPLANT: CPT | Mod: 51 | Performed by: OBSTETRICS & GYNECOLOGY

## 2025-03-18 NOTE — PROGRESS NOTES
SUBJECTIVE:     Leesa Shah is a 44 year old requests removal of an Mirena IUD.   She is  interested in conceiving No  Reports adverse side effect from the IUD, Yes  Symptoms include spotting with sex.;  Request alternative contraception Yes.NEXPLANON      OBJECTIVE  PROCEDURE1  External genitalia: normal without lesions.  Vagina: normal mucosa and rugae, without  discharge.  Cervix: normal without lesion.    String is noted at the os, ringed forceps used to remove IUD.    PROCEDURE2  Leesa Shah is a 44 year old female  No LMP recorded. (Menstrual status: IUD).     I previously reviewed options regarding contraception, and again today reviewed the Nexplanon as a sub-dermal implant effectiveness for 3 years.     /70 (BP Location: Right arm, Patient Position: Sitting, Cuff Size: Adult Regular)   Pulse 64   SpO2 99%        We reviewed the mechanism of actions, goals and limitations of the use of the medication, as well as the contraindications.  Bleeding patterns were also reviewed with her. She voiced her understanding.  The patient and I reviewed Nexplanon removal, and should she choose to have the Nexplanon removed, she needs to have someone trained for the insertion and removal. Informed consent obtained.    Patient was placed in a supine position. Left arm was flexed at the elbow and externally rotated. Insertion site was noted at the 8 cm above the elbow crease at the inner side of the upper arm overlying the grove between the biceps and the triceps. A second yong was made 4 cm from the first to use as a guide. The area of the insertion site was prepped with Betadine. Lidocaine 1% was used along the planned insertion site. The Nexplanon was removed from its pack. The Nexplanon applicator was held just above the needle at the textured surface area and the transparent protection cap was removed from the needle. The implant could be seen by looking into the tip of the needle. The implant  could be seen by looking into the tip of the needle.  The skin was stretched at the insertion site. The needle was inserted with beveled side up just underneath the skin. Tenting was undertaken while the insertion needle to its full length. The purple slider was unlocked. The slider was moved fully back until it stopped.  The applicator was removed from her arm. The implant was palpated by both myself and the patient. The betadine was removed from her arm. Telfa was applied to site, along with pressure dressing and sterile gauze.    The patient was given aftercare instructions, her user card with the lot number. The patient tolerated the procedure well.  No apparent complications.      Lot#:[id number]  Expiration date:2027-02        ICD-10-CM    1. Encounter for IUD removal  Z30.432 REMOVE INTRAUTERINE DEVICE      2. Nexplanon insertion  Z30.017 etonogestrel (NEXPLANON) subdermal implant 68 mg     INSERTION NON-BIODEGRADABLE DRUG DELIVERY IMPLANT        CEPHAS AGBEH, MD.

## 2025-03-28 ENCOUNTER — ANCILLARY PROCEDURE (OUTPATIENT)
Dept: MAMMOGRAPHY | Facility: CLINIC | Age: 45
End: 2025-03-28
Attending: NURSE PRACTITIONER
Payer: COMMERCIAL

## 2025-03-28 DIAGNOSIS — Z12.31 VISIT FOR SCREENING MAMMOGRAM: ICD-10-CM

## 2025-03-28 PROCEDURE — 77067 SCR MAMMO BI INCL CAD: CPT | Mod: TC | Performed by: RADIOLOGY

## 2025-03-28 PROCEDURE — 77063 BREAST TOMOSYNTHESIS BI: CPT | Mod: TC | Performed by: RADIOLOGY

## 2025-04-28 ENCOUNTER — OFFICE VISIT (OUTPATIENT)
Dept: FAMILY MEDICINE | Facility: CLINIC | Age: 45
End: 2025-04-28
Payer: COMMERCIAL

## 2025-04-28 VITALS
BODY MASS INDEX: 30.19 KG/M2 | OXYGEN SATURATION: 100 % | HEART RATE: 62 BPM | SYSTOLIC BLOOD PRESSURE: 100 MMHG | RESPIRATION RATE: 16 BRPM | TEMPERATURE: 97.1 F | DIASTOLIC BLOOD PRESSURE: 64 MMHG | HEIGHT: 65 IN

## 2025-04-28 DIAGNOSIS — I73.00 RAYNAUD'S DISEASE WITHOUT GANGRENE: Primary | ICD-10-CM

## 2025-04-28 DIAGNOSIS — Z97.5 BREAKTHROUGH BLEEDING ON NEXPLANON: ICD-10-CM

## 2025-04-28 DIAGNOSIS — N92.1 BREAKTHROUGH BLEEDING ON NEXPLANON: ICD-10-CM

## 2025-04-28 PROCEDURE — 99214 OFFICE O/P EST MOD 30 MIN: CPT | Performed by: NURSE PRACTITIONER

## 2025-04-28 RX ORDER — AMLODIPINE BESYLATE 5 MG/1
5 TABLET ORAL AT BEDTIME
Qty: 90 TABLET | Refills: 0 | Status: SHIPPED | OUTPATIENT
Start: 2025-04-28

## 2025-04-28 ASSESSMENT — PAIN SCALES - GENERAL: PAINLEVEL_OUTOF10: NO PAIN (0)

## 2025-04-28 NOTE — PROGRESS NOTES
"  Assessment & Plan     Raynaud's disease without gangrene  -discussed lifestyle modifications to reduce symptoms, will do trial of CCB.  - amLODIPine (NORVASC) 5 MG tablet; Take 1 tablet (5 mg) by mouth at bedtime.    Breakthrough bleeding on Nexplanon  -Discussed treatment options including watchful waiting as irregular bleeding can improve over time, or treatment with high dose NSAIDs to stop bleeding or hormone therapy, or removal of Nexplanon. After discussion patient still requesting Nexplanon removal, appointment set up with provider trained in Nexplanon removal.           BMI  Estimated body mass index is 30.19 kg/m  as calculated from the following:    Height as of this encounter: 1.645 m (5' 4.75\").    Weight as of 2/5/25: 81.6 kg (180 lb).             Alicia Landers is a 44 year old, presenting for the following health issues:  Contraception and Musculoskeletal Problem        4/28/2025     9:28 AM   Additional Questions   Roomed by Casie THOMSON   Accompanied by self     Waking up every night with bilateral hand numbness. Warming her hands helps. Did try sleeping with gloves but will forget at times. Also triggered in the winter or picking up something cold.     Having a lot of bleeding with Nexplanon. Having to change tampon/pad every couple hours. Would like it out.     Was having spotting after intercourse with IUD.         History of Present Illness       Reason for visit:  Hand numbness and birth control    She eats 2-3 servings of fruits and vegetables daily.She consumes 1 sweetened beverage(s) daily.She exercises with enough effort to increase her heart rate 30 to 60 minutes per day.  She exercises with enough effort to increase her heart rate 6 days per week.   She is taking medications regularly.                      Objective    /64   Pulse 62   Temp 97.1  F (36.2  C) (Tympanic)   Resp 16   Ht 1.645 m (5' 4.75\")   SpO2 100%   BMI 30.19 kg/m    Body mass index is 30.19 " kg/m .  Physical Exam  Constitutional:       General: She is not in acute distress.     Appearance: Normal appearance.   HENT:      Right Ear: External ear normal.      Left Ear: External ear normal.   Eyes:      Pupils: Pupils are equal, round, and reactive to light.   Cardiovascular:      Rate and Rhythm: Normal rate.      Pulses: Normal pulses.      Heart sounds: Normal heart sounds. No murmur heard.     No friction rub. No gallop.   Pulmonary:      Effort: Pulmonary effort is normal. No respiratory distress.      Breath sounds: No wheezing, rhonchi or rales.   Skin:     General: Skin is warm and dry.          Neurological:      General: No focal deficit present.      Mental Status: She is alert and oriented to person, place, and time.   Psychiatric:         Mood and Affect: Mood normal.         Behavior: Behavior normal.         Thought Content: Thought content normal.         Judgment: Judgment normal.                  Signed Electronically by: SOURAV Martinez CNP

## 2025-05-01 ENCOUNTER — OFFICE VISIT (OUTPATIENT)
Dept: FAMILY MEDICINE | Facility: CLINIC | Age: 45
End: 2025-05-01
Payer: COMMERCIAL

## 2025-05-01 VITALS
BODY MASS INDEX: 29.99 KG/M2 | HEIGHT: 65 IN | DIASTOLIC BLOOD PRESSURE: 62 MMHG | RESPIRATION RATE: 20 BRPM | SYSTOLIC BLOOD PRESSURE: 100 MMHG | TEMPERATURE: 98.2 F | HEART RATE: 70 BPM | OXYGEN SATURATION: 100 % | WEIGHT: 180 LBS

## 2025-05-01 DIAGNOSIS — Z30.46 NEXPLANON REMOVAL: Primary | ICD-10-CM

## 2025-05-01 PROCEDURE — 11982 REMOVE DRUG IMPLANT DEVICE: CPT | Performed by: PHYSICIAN ASSISTANT

## 2025-05-01 RX ORDER — LIDOCAINE HYDROCHLORIDE AND EPINEPHRINE 10; 10 MG/ML; UG/ML
2 INJECTION, SOLUTION INFILTRATION; PERINEURAL ONCE
Status: COMPLETED | OUTPATIENT
Start: 2025-05-01 | End: 2025-05-01

## 2025-05-01 RX ADMIN — LIDOCAINE HYDROCHLORIDE AND EPINEPHRINE 2 ML: 10; 10 INJECTION, SOLUTION INFILTRATION; PERINEURAL at 11:36

## 2025-05-01 ASSESSMENT — PAIN SCALES - GENERAL: PAINLEVEL_OUTOF10: NO PAIN (0)

## 2025-05-01 NOTE — PROGRESS NOTES
{PROVIDER CHARTING PREFERENCE:275170}    Alicia Landers is a 44 year old, presenting for the following health issues:  Contraception (Remove nexplanon)      5/1/2025    10:38 AM   Additional Questions   Roomed by eboni   Accompanied by self         5/1/2025    10:38 AM   Patient Reported Additional Medications   Patient reports taking the following new medications see chart     Contraception          {MA/LPN/RN Pre-Provider Visit Orders- hCG/UA/Strep (Optional):713468}  {SUPERLIST (Optional):666153}  {additonal problems for provider to add (Optional):252111}    {ROS Picklists (Optional):009660}      Objective    There were no vitals taken for this visit.  There is no height or weight on file to calculate BMI.  Physical Exam   {Exam List (Optional):098050}    {Diagnostic Test Results (Optional):672274}        Signed Electronically by: JONO ALMEIDA PA-C  {Email feedback regarding this note to [email or web address]   :330230}   implant removed    Technique: On the left arm  Skin prep Betadine  Anesthesia 1% lidocaine, with epi  Procedure: Small incision (<5mm) was made at distal end of palpable implant, curved hemostat or mosquito forceps was used to isolate the implant and bring it to the incision, the fibrous capsule containing the implant  was incised and the Implant was removed intact.      EBL: minimal  Complications:  No  Tolerance:  Pt tolerated procedure well and was in stable condition.   Dressing:    A pressure bandage was placed for the next 24 hours. Inner bandage for 3 days. Steri strips until they fall off.     Contraception was discussed and patient chose the following method none      Follow up: Pt was instructed to call if bleeding, severe pain or foul smell.     JONO ALMEIDA PA-C

## 2025-06-13 ENCOUNTER — VIRTUAL VISIT (OUTPATIENT)
Dept: FAMILY MEDICINE | Facility: CLINIC | Age: 45
End: 2025-06-13
Payer: COMMERCIAL

## 2025-06-13 DIAGNOSIS — E66.09 CLASS 1 OBESITY DUE TO EXCESS CALORIES WITHOUT SERIOUS COMORBIDITY WITH BODY MASS INDEX (BMI) OF 30.0 TO 30.9 IN ADULT: Primary | ICD-10-CM

## 2025-06-13 DIAGNOSIS — E66.811 CLASS 1 OBESITY DUE TO EXCESS CALORIES WITHOUT SERIOUS COMORBIDITY WITH BODY MASS INDEX (BMI) OF 30.0 TO 30.9 IN ADULT: Primary | ICD-10-CM

## 2025-06-13 PROCEDURE — 98006 SYNCH AUDIO-VIDEO EST MOD 30: CPT | Performed by: NURSE PRACTITIONER

## 2025-06-13 RX ORDER — PHENTERMINE HYDROCHLORIDE 15 MG/1
15 CAPSULE ORAL EVERY MORNING
Qty: 30 CAPSULE | Refills: 2 | Status: SHIPPED | OUTPATIENT
Start: 2025-06-13

## 2025-06-13 RX ORDER — TOPIRAMATE 25 MG/1
25 TABLET, FILM COATED ORAL DAILY
Qty: 30 TABLET | Refills: 2 | Status: SHIPPED | OUTPATIENT
Start: 2025-06-13

## 2025-06-13 NOTE — PROGRESS NOTES
"Leesa is a 45 year old who is being evaluated via a billable video visit.    How would you like to obtain your AVS? MyChart  If the video visit is dropped, the invitation should be resent by: Text to cell phone: [phone]  Will anyone else be joining your video visit? No      Assessment & Plan     Class 1 obesity due to excess calories without serious comorbidity with body mass index (BMI) of 30.0 to 30.9 in adult  discussed possible side effects of phentermine including increased risk heart attack/stroke, hypertension, and worsening insomnia. Discussed controlled substance policy and expectations.  The [place] Prescription Monitoring Program has been reviewed and there are no concerns about diversionary activity for controlled substances at this time.    - phentermine 15 MG capsule; Take 1 capsule (15 mg) by mouth every morning.  - topiramate (TOPAMAX) 25 MG tablet; Take 1 tablet (25 mg) by mouth daily.      Follow up in 3 months or sooner if needed for dose adjustment.    BMI  Estimated body mass index is 30.19 kg/m  as calculated from the following:    Height as of 5/1/25: 1.645 m (5' 4.75\").    Weight as of 5/1/25: 81.6 kg (180 lb).             Subjective   Leesa is a 45 year old, presenting for the following health issues:  weight loss management        6/13/2025     4:18 PM   Additional Questions   Roomed by Casie THOMSON   Accompanied by self     Video Start Time: 16:33    Would like to  discuss weight loss medication.     Working out 6 days a week. Struggles with intense cravings at night. Denies binging. States during the day eating regular meals. Not trying to stick to a particular diet or calorie count. Was getting GLP-1 medication through online service but didn't find it very helpful and the expense was too much. She would like to go back on phentermine and topiramate. She found it very effective for the first month but then felt it starting to wear off.         History of Present Illness "       Reason for visit:  Weight loss   She is taking medications regularly.                    Objective           Vitals:  No vitals were obtained today due to virtual visit.    Physical Exam   GENERAL: alert and no distress  EYES: Eyes grossly normal to inspection.  No discharge or erythema, or obvious scleral/conjunctival abnormalities.  RESP: No audible wheeze, cough, or visible cyanosis.    SKIN: Visible skin clear. No significant rash, abnormal pigmentation or lesions.  NEURO: Cranial nerves grossly intact.  Mentation and speech appropriate for age.  PSYCH: Appropriate affect, tone, and pace of words          Video-Visit Details    Type of service:  Video Visit   Video End Time:16:45  Originating Location (pt. Location): Home    Distant Location (provider location):  On-site  Platform used for Video Visit: Sujey  Signed Electronically by: SOURAV Martinez CNP

## 2025-07-07 ENCOUNTER — VIRTUAL VISIT (OUTPATIENT)
Dept: FAMILY MEDICINE | Facility: CLINIC | Age: 45
End: 2025-07-07
Payer: COMMERCIAL

## 2025-07-07 DIAGNOSIS — E66.811 CLASS 1 OBESITY DUE TO EXCESS CALORIES WITHOUT SERIOUS COMORBIDITY WITH BODY MASS INDEX (BMI) OF 30.0 TO 30.9 IN ADULT: ICD-10-CM

## 2025-07-07 DIAGNOSIS — E66.09 CLASS 1 OBESITY DUE TO EXCESS CALORIES WITHOUT SERIOUS COMORBIDITY WITH BODY MASS INDEX (BMI) OF 30.0 TO 30.9 IN ADULT: ICD-10-CM

## 2025-07-07 PROCEDURE — 98006 SYNCH AUDIO-VIDEO EST MOD 30: CPT | Performed by: NURSE PRACTITIONER

## 2025-07-07 RX ORDER — TOPIRAMATE 50 MG/1
50 TABLET, FILM COATED ORAL AT BEDTIME
Qty: 90 TABLET | Refills: 0 | Status: SHIPPED | OUTPATIENT
Start: 2025-07-07

## 2025-07-07 NOTE — PROGRESS NOTES
"Leesa is a 45 year old who is being evaluated via a billable video visit.    How would you like to obtain your AVS? MyChart  If the video visit is dropped, the invitation should be resent by: Text to cell phone: [phone]  Will anyone else be joining your video visit? No      Assessment & Plan     Class 1 obesity due to excess calories without serious comorbidity with body mass index (BMI) of 30.0 to 30.9 in adult  -will increase dose of topiramate to 50 mg, if still not effective can increase up to 100 mg  -continue phentermine 15 mg  - topiramate (TOPAMAX) 50 MG tablet; Take 1 tablet (50 mg) by mouth at bedtime.          BMI  Estimated body mass index is 30.19 kg/m  as calculated from the following:    Height as of 5/1/25: 1.645 m (5' 4.75\").    Weight as of 5/1/25: 81.6 kg (180 lb).             Subjective   Leesa is a 45 year old, presenting for the following health issues:  Recheck Medication        7/7/2025    11:04 AM   Additional Questions   Roomed by Casie THOMSON   Accompanied by self       Video Start Time: 11:34    States the medication she takes in the morning is helpful but at night still having intense cravings. Takes topiramate at 7 and goes to sleep at 10.     Hasn't weighed herself but is feeling clothing is fitting looser.     History of Present Illness       Reason for visit:  Appetite suppressant    She eats 2-3 servings of fruits and vegetables daily.She consumes 1 sweetened beverage(s) daily.She exercises with enough effort to increase her heart rate 30 to 60 minutes per day.  She exercises with enough effort to increase her heart rate 5 days per week.   She is taking medications regularly.                    Objective           Vitals:  No vitals were obtained today due to virtual visit.    Physical Exam   GENERAL: alert and no distress  EYES: Eyes grossly normal to inspection.  No discharge or erythema, or obvious scleral/conjunctival abnormalities.  RESP: No audible wheeze, cough, or " visible cyanosis.    SKIN: Visible skin clear. No significant rash, abnormal pigmentation or lesions.  NEURO: Cranial nerves grossly intact.  Mentation and speech appropriate for age.  PSYCH: Appropriate affect, tone, and pace of words          Video-Visit Details    Type of service:  Video Visit   Video End Time:11:52  Originating Location (pt. Location): Home    Distant Location (provider location):  On-site  Platform used for Video Visit: Sujey  Signed Electronically by: SOURAV Martinez CNP

## 2025-08-21 DIAGNOSIS — I73.00 RAYNAUD'S DISEASE WITHOUT GANGRENE: ICD-10-CM

## 2025-08-21 RX ORDER — AMLODIPINE BESYLATE 5 MG/1
5 TABLET ORAL AT BEDTIME
Qty: 90 TABLET | Refills: 1 | Status: SHIPPED | OUTPATIENT
Start: 2025-08-21